# Patient Record
Sex: FEMALE | Race: BLACK OR AFRICAN AMERICAN | NOT HISPANIC OR LATINO | Employment: PART TIME | RURAL
[De-identification: names, ages, dates, MRNs, and addresses within clinical notes are randomized per-mention and may not be internally consistent; named-entity substitution may affect disease eponyms.]

---

## 2019-07-31 ENCOUNTER — HISTORICAL (OUTPATIENT)
Dept: ADMINISTRATIVE | Facility: HOSPITAL | Age: 44
End: 2019-07-31

## 2019-08-02 LAB
LAB AP COMMENTS: NORMAL
LAB AP GENERAL CAT - HISTORICAL: NORMAL
LAB AP INTERPRETATION/RESULT - HISTORICAL: NEGATIVE
LAB AP SPECIMEN ADEQUACY - HISTORICAL: NORMAL
LAB AP SPECIMEN SUBMITTED - HISTORICAL: NORMAL

## 2020-08-21 ENCOUNTER — HISTORICAL (OUTPATIENT)
Dept: ADMINISTRATIVE | Facility: HOSPITAL | Age: 45
End: 2020-08-21

## 2021-10-11 ENCOUNTER — HOSPITAL ENCOUNTER (EMERGENCY)
Facility: HOSPITAL | Age: 46
Discharge: HOME | End: 2021-10-11

## 2021-10-11 VITALS
SYSTOLIC BLOOD PRESSURE: 123 MMHG | HEIGHT: 69 IN | OXYGEN SATURATION: 97 % | WEIGHT: 293 LBS | HEART RATE: 83 BPM | BODY MASS INDEX: 43.4 KG/M2 | RESPIRATION RATE: 18 BRPM | TEMPERATURE: 98 F | DIASTOLIC BLOOD PRESSURE: 76 MMHG

## 2021-10-11 DIAGNOSIS — L02.91 ABSCESS: Primary | ICD-10-CM

## 2021-10-11 PROCEDURE — 99283 PR EMERGENCY DEPT VISIT,LEVEL III: ICD-10-PCS | Mod: ,,, | Performed by: NURSE PRACTITIONER

## 2021-10-11 PROCEDURE — 25000003 PHARM REV CODE 250: Performed by: NURSE PRACTITIONER

## 2021-10-11 PROCEDURE — 99284 EMERGENCY DEPT VISIT MOD MDM: CPT

## 2021-10-11 PROCEDURE — 99283 EMERGENCY DEPT VISIT LOW MDM: CPT | Mod: ,,, | Performed by: NURSE PRACTITIONER

## 2021-10-11 PROCEDURE — 96372 THER/PROPH/DIAG INJ SC/IM: CPT

## 2021-10-11 RX ORDER — CLINDAMYCIN PHOSPHATE 150 MG/ML
600 INJECTION, SOLUTION INTRAVENOUS
Status: COMPLETED | OUTPATIENT
Start: 2021-10-11 | End: 2021-10-11

## 2021-10-11 RX ORDER — GLIPIZIDE 10 MG/1
10 TABLET ORAL
COMMUNITY

## 2021-10-11 RX ORDER — MUPIROCIN 20 MG/G
OINTMENT TOPICAL 3 TIMES DAILY
Qty: 22 G | Refills: 0 | OUTPATIENT
Start: 2021-10-11 | End: 2023-01-22

## 2021-10-11 RX ORDER — CLINDAMYCIN HYDROCHLORIDE 300 MG/1
300 CAPSULE ORAL EVERY 6 HOURS
Qty: 40 CAPSULE | Refills: 0 | Status: SHIPPED | OUTPATIENT
Start: 2021-10-11 | End: 2021-10-21

## 2021-10-11 RX ORDER — METFORMIN HYDROCHLORIDE 500 MG/1
500 TABLET ORAL 2 TIMES DAILY WITH MEALS
COMMUNITY

## 2021-10-11 RX ADMIN — CLINDAMYCIN PHOSPHATE 600 MG: 150 INJECTION, SOLUTION INTRAVENOUS at 09:10

## 2021-10-12 ENCOUNTER — TELEPHONE (OUTPATIENT)
Dept: EMERGENCY MEDICINE | Facility: HOSPITAL | Age: 46
End: 2021-10-12

## 2021-12-19 ENCOUNTER — HOSPITAL ENCOUNTER (EMERGENCY)
Facility: HOSPITAL | Age: 46
Discharge: HOME | End: 2021-12-19
Attending: FAMILY MEDICINE

## 2021-12-19 VITALS
SYSTOLIC BLOOD PRESSURE: 124 MMHG | RESPIRATION RATE: 18 BRPM | BODY MASS INDEX: 43.4 KG/M2 | DIASTOLIC BLOOD PRESSURE: 78 MMHG | WEIGHT: 293 LBS | HEIGHT: 69 IN | TEMPERATURE: 98 F | OXYGEN SATURATION: 99 % | HEART RATE: 82 BPM

## 2021-12-19 DIAGNOSIS — I95.1 ORTHOSTATIC HYPOTENSION: ICD-10-CM

## 2021-12-19 DIAGNOSIS — I49.9 ARRHYTHMIA: ICD-10-CM
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
## 2021-12-19 DIAGNOSIS — S60.212A CONTUSION OF LEFT WRIST, INITIAL ENCOUNTER: Primary | ICD-10-CM

## 2021-12-19 DIAGNOSIS — R55 NEAR SYNCOPE: ICD-10-CM

## 2021-12-19 LAB
ALBUMIN SERPL BCP-MCNC: 3.4 G/DL (ref 3.5–5)
ALBUMIN/GLOB SERPL: 0.8 {RATIO}
ALP SERPL-CCNC: 93 U/L (ref 39–100)
ALT SERPL W P-5'-P-CCNC: 30 U/L (ref 13–56)
ANION GAP SERPL CALCULATED.3IONS-SCNC: 16 MMOL/L (ref 7–16)
AST SERPL W P-5'-P-CCNC: 13 U/L (ref 15–37)
BASOPHILS # BLD AUTO: 0.04 K/UL (ref 0–0.2)
BASOPHILS NFR BLD AUTO: 0.4 % (ref 0–1)
BILIRUB SERPL-MCNC: 0.3 MG/DL (ref 0–1.2)
BUN SERPL-MCNC: 10 MG/DL (ref 7–18)
BUN/CREAT SERPL: 9 (ref 6–20)
CALCIUM SERPL-MCNC: 9.2 MG/DL (ref 8.5–10.1)
CHLORIDE SERPL-SCNC: 100 MMOL/L (ref 98–107)
CO2 SERPL-SCNC: 27 MMOL/L (ref 21–32)
CREAT SERPL-MCNC: 1.12 MG/DL (ref 0.55–1.02)
DIFFERENTIAL METHOD BLD: ABNORMAL
EOSINOPHIL # BLD AUTO: 0.24 K/UL (ref 0–0.5)
EOSINOPHIL NFR BLD AUTO: 2.2 % (ref 1–4)
ERYTHROCYTE [DISTWIDTH] IN BLOOD BY AUTOMATED COUNT: 13.7 % (ref 11.5–14.5)
GLOBULIN SER-MCNC: 4.5 G/DL (ref 2–4)
GLUCOSE SERPL-MCNC: 311 MG/DL (ref 74–106)
HCT VFR BLD AUTO: 36.4 % (ref 38–47)
HGB BLD-MCNC: 12.5 G/DL (ref 12–16)
LYMPHOCYTES # BLD AUTO: 3.45 K/UL (ref 1–4.8)
LYMPHOCYTES NFR BLD AUTO: 31.1 % (ref 27–41)
MCH RBC QN AUTO: 30.3 PG (ref 27–31)
MCHC RBC AUTO-ENTMCNC: 34.3 G/DL (ref 32–36)
MCV RBC AUTO: 88.3 FL (ref 80–96)
MONOCYTES # BLD AUTO: 0.57 K/UL (ref 0–0.8)
MONOCYTES NFR BLD AUTO: 5.1 % (ref 2–6)
MPC BLD CALC-MCNC: 10.9 FL (ref 9.4–12.4)
NEUTROPHILS # BLD AUTO: 6.78 K/UL (ref 1.8–7.7)
NEUTROPHILS NFR BLD AUTO: 61.2 % (ref 53–65)
NT-PROBNP SERPL-MCNC: 17 PG/ML (ref 1–125)
PLATELET # BLD AUTO: 263 K/UL (ref 150–400)
POTASSIUM SERPL-SCNC: 4.5 MMOL/L (ref 3.5–5.1)
PROT SERPL-MCNC: 7.9 G/DL (ref 6.4–8.2)
RBC # BLD AUTO: 4.12 M/UL (ref 4.2–5.4)
SODIUM SERPL-SCNC: 138 MMOL/L (ref 136–145)
TROPONIN I SERPL HS-MCNC: 4.2 PG/ML
WBC # BLD AUTO: 11.08 K/UL (ref 4.5–11)

## 2021-12-19 PROCEDURE — 63600175 PHARM REV CODE 636 W HCPCS: Performed by: FAMILY MEDICINE

## 2021-12-19 PROCEDURE — 83880 ASSAY OF NATRIURETIC PEPTIDE: CPT | Performed by: FAMILY MEDICINE

## 2021-12-19 PROCEDURE — 96372 THER/PROPH/DIAG INJ SC/IM: CPT

## 2021-12-19 PROCEDURE — 99285 EMERGENCY DEPT VISIT HI MDM: CPT | Mod: 25

## 2021-12-19 PROCEDURE — 99283 PR EMERGENCY DEPT VISIT,LEVEL III: ICD-10-PCS | Mod: ,,, | Performed by: FAMILY MEDICINE

## 2021-12-19 PROCEDURE — 99283 EMERGENCY DEPT VISIT LOW MDM: CPT | Mod: ,,, | Performed by: FAMILY MEDICINE

## 2021-12-19 PROCEDURE — 80053 COMPREHEN METABOLIC PANEL: CPT | Performed by: FAMILY MEDICINE

## 2021-12-19 PROCEDURE — 93010 ELECTROCARDIOGRAM REPORT: CPT | Mod: ,,, | Performed by: INTERNAL MEDICINE

## 2021-12-19 PROCEDURE — 84484 ASSAY OF TROPONIN QUANT: CPT | Performed by: FAMILY MEDICINE

## 2021-12-19 PROCEDURE — 36415 COLL VENOUS BLD VENIPUNCTURE: CPT | Performed by: FAMILY MEDICINE

## 2021-12-19 PROCEDURE — 85025 COMPLETE CBC W/AUTO DIFF WBC: CPT | Performed by: FAMILY MEDICINE

## 2021-12-19 PROCEDURE — 93005 ELECTROCARDIOGRAM TRACING: CPT

## 2021-12-19 PROCEDURE — 93010 EKG 12-LEAD: ICD-10-PCS | Mod: ,,, | Performed by: INTERNAL MEDICINE

## 2021-12-19 RX ORDER — KETOROLAC TROMETHAMINE 30 MG/ML
30 INJECTION, SOLUTION INTRAMUSCULAR; INTRAVENOUS
Status: COMPLETED | OUTPATIENT
Start: 2021-12-19 | End: 2021-12-19

## 2021-12-19 RX ADMIN — KETOROLAC TROMETHAMINE 30 MG: 30 INJECTION, SOLUTION INTRAMUSCULAR at 05:12

## 2021-12-20 ENCOUNTER — TELEPHONE (OUTPATIENT)
Dept: EMERGENCY MEDICINE | Facility: HOSPITAL | Age: 46
End: 2021-12-20

## 2021-12-21 ENCOUNTER — TELEPHONE (OUTPATIENT)
Dept: EMERGENCY MEDICINE | Facility: HOSPITAL | Age: 46
End: 2021-12-21

## 2022-10-04 ENCOUNTER — HOSPITAL ENCOUNTER (EMERGENCY)
Facility: HOSPITAL | Age: 47
Discharge: HOME | End: 2022-10-04
Attending: EMERGENCY MEDICINE

## 2022-10-04 VITALS
OXYGEN SATURATION: 96 % | HEART RATE: 86 BPM | HEIGHT: 69 IN | DIASTOLIC BLOOD PRESSURE: 62 MMHG | TEMPERATURE: 98 F | SYSTOLIC BLOOD PRESSURE: 120 MMHG | BODY MASS INDEX: 43.4 KG/M2 | RESPIRATION RATE: 18 BRPM | WEIGHT: 293 LBS

## 2022-10-04 DIAGNOSIS — R42 VERTIGO: Primary | ICD-10-CM

## 2022-10-04 DIAGNOSIS — K52.9 GASTROENTERITIS: ICD-10-CM

## 2022-10-04 LAB
ALBUMIN SERPL BCP-MCNC: 3.7 G/DL (ref 3.5–5)
ALBUMIN/GLOB SERPL: 1 {RATIO}
ALP SERPL-CCNC: 81 U/L (ref 39–100)
ALT SERPL W P-5'-P-CCNC: 27 U/L (ref 13–56)
ANION GAP SERPL CALCULATED.3IONS-SCNC: 12 MMOL/L (ref 7–16)
AST SERPL W P-5'-P-CCNC: 21 U/L (ref 15–37)
BACTERIA #/AREA URNS HPF: ABNORMAL /HPF
BASOPHILS # BLD AUTO: 0.04 K/UL (ref 0–0.2)
BASOPHILS NFR BLD AUTO: 0.5 % (ref 0–1)
BILIRUB SERPL-MCNC: 0.5 MG/DL (ref ?–1.2)
BILIRUB UR QL STRIP: NEGATIVE
BUN SERPL-MCNC: 10 MG/DL (ref 7–18)
BUN/CREAT SERPL: 10 (ref 6–20)
CALCIUM SERPL-MCNC: 9.5 MG/DL (ref 8.5–10.1)
CHLORIDE SERPL-SCNC: 104 MMOL/L (ref 98–107)
CLARITY UR: CLEAR
CO2 SERPL-SCNC: 27 MMOL/L (ref 21–32)
COLOR UR: ABNORMAL
CREAT SERPL-MCNC: 0.96 MG/DL (ref 0.55–1.02)
DIFFERENTIAL METHOD BLD: ABNORMAL
EGFR (NO RACE VARIABLE) (RUSH/TITUS): 73 ML/MIN/1.73M²
EOSINOPHIL # BLD AUTO: 0.34 K/UL (ref 0–0.5)
EOSINOPHIL NFR BLD AUTO: 3.9 % (ref 1–4)
ERYTHROCYTE [DISTWIDTH] IN BLOOD BY AUTOMATED COUNT: 13.4 % (ref 11.5–14.5)
FLUAV AG UPPER RESP QL IA.RAPID: NEGATIVE
FLUBV AG UPPER RESP QL IA.RAPID: NEGATIVE
GLOBULIN SER-MCNC: 3.7 G/DL (ref 2–4)
GLUCOSE SERPL-MCNC: 201 MG/DL (ref 74–106)
GLUCOSE UR STRIP-MCNC: 70 MG/DL
HCT VFR BLD AUTO: 37.8 % (ref 38–47)
HGB BLD-MCNC: 12 G/DL (ref 12–16)
IMM GRANULOCYTES # BLD AUTO: 0.02 K/UL (ref 0–0.04)
IMM GRANULOCYTES NFR BLD: 0.2 % (ref 0–0.4)
KETONES UR STRIP-SCNC: NEGATIVE MG/DL
LEUKOCYTE ESTERASE UR QL STRIP: NEGATIVE
LYMPHOCYTES # BLD AUTO: 2.83 K/UL (ref 1–4.8)
LYMPHOCYTES NFR BLD AUTO: 32.2 % (ref 27–41)
MCH RBC QN AUTO: 29.6 PG (ref 27–31)
MCHC RBC AUTO-ENTMCNC: 31.7 G/DL (ref 32–36)
MCV RBC AUTO: 93.1 FL (ref 80–96)
MONOCYTES # BLD AUTO: 0.57 K/UL (ref 0–0.8)
MONOCYTES NFR BLD AUTO: 6.5 % (ref 2–6)
MPC BLD CALC-MCNC: 10.9 FL (ref 9.4–12.4)
MUCOUS THREADS #/AREA URNS HPF: ABNORMAL /HPF
NEUTROPHILS # BLD AUTO: 4.99 K/UL (ref 1.8–7.7)
NEUTROPHILS NFR BLD AUTO: 56.7 % (ref 53–65)
NITRITE UR QL STRIP: POSITIVE
NRBC # BLD AUTO: 0 X10E3/UL
NRBC, AUTO (.00): 0 %
PH UR STRIP: 5.5 PH UNITS
PLATELET # BLD AUTO: 240 K/UL (ref 150–400)
POTASSIUM SERPL-SCNC: 5.2 MMOL/L (ref 3.5–5.1)
PROT SERPL-MCNC: 7.4 G/DL (ref 6.4–8.2)
PROT UR QL STRIP: NEGATIVE
RBC # BLD AUTO: 4.06 M/UL (ref 4.2–5.4)
RBC # UR STRIP: ABNORMAL /UL
RBC #/AREA URNS HPF: ABNORMAL /HPF
SARS-COV+SARS-COV-2 AG RESP QL IA.RAPID: NEGATIVE
SODIUM SERPL-SCNC: 138 MMOL/L (ref 136–145)
SP GR UR STRIP: 1.02
SQUAMOUS #/AREA URNS LPF: ABNORMAL /LPF
UROBILINOGEN UR STRIP-ACNC: NORMAL MG/DL
WBC # BLD AUTO: 8.79 K/UL (ref 4.5–11)
WBC #/AREA URNS HPF: ABNORMAL /HPF

## 2022-10-04 PROCEDURE — 87186 SC STD MICRODIL/AGAR DIL: CPT | Performed by: EMERGENCY MEDICINE

## 2022-10-04 PROCEDURE — 63600175 PHARM REV CODE 636 W HCPCS: Performed by: EMERGENCY MEDICINE

## 2022-10-04 PROCEDURE — 99284 PR EMERGENCY DEPT VISIT,LEVEL IV: ICD-10-PCS | Mod: CS,,, | Performed by: EMERGENCY MEDICINE

## 2022-10-04 PROCEDURE — 80053 COMPREHEN METABOLIC PANEL: CPT | Performed by: EMERGENCY MEDICINE

## 2022-10-04 PROCEDURE — 99284 EMERGENCY DEPT VISIT MOD MDM: CPT | Mod: CS,,, | Performed by: EMERGENCY MEDICINE

## 2022-10-04 PROCEDURE — 87428 SARSCOV & INF VIR A&B AG IA: CPT | Performed by: EMERGENCY MEDICINE

## 2022-10-04 PROCEDURE — 96365 THER/PROPH/DIAG IV INF INIT: CPT

## 2022-10-04 PROCEDURE — 99285 EMERGENCY DEPT VISIT HI MDM: CPT | Mod: 25

## 2022-10-04 PROCEDURE — 87077 CULTURE AEROBIC IDENTIFY: CPT | Performed by: EMERGENCY MEDICINE

## 2022-10-04 PROCEDURE — 25000003 PHARM REV CODE 250: Performed by: EMERGENCY MEDICINE

## 2022-10-04 PROCEDURE — 36415 COLL VENOUS BLD VENIPUNCTURE: CPT | Performed by: EMERGENCY MEDICINE

## 2022-10-04 PROCEDURE — 96366 THER/PROPH/DIAG IV INF ADDON: CPT

## 2022-10-04 PROCEDURE — 81001 URINALYSIS AUTO W/SCOPE: CPT | Performed by: EMERGENCY MEDICINE

## 2022-10-04 PROCEDURE — 85025 COMPLETE CBC W/AUTO DIFF WBC: CPT | Performed by: EMERGENCY MEDICINE

## 2022-10-04 RX ORDER — MECLIZINE HYDROCHLORIDE 25 MG/1
25 TABLET ORAL
Status: COMPLETED | OUTPATIENT
Start: 2022-10-04 | End: 2022-10-04

## 2022-10-04 RX ORDER — PROMETHAZINE HYDROCHLORIDE 25 MG/1
25 TABLET ORAL EVERY 6 HOURS PRN
Qty: 16 TABLET | Refills: 0 | OUTPATIENT
Start: 2022-10-04 | End: 2023-01-22

## 2022-10-04 RX ORDER — PROMETHAZINE HYDROCHLORIDE 25 MG/1
25 SUPPOSITORY RECTAL EVERY 6 HOURS PRN
Qty: 12 SUPPOSITORY | Refills: 0 | OUTPATIENT
Start: 2022-10-04 | End: 2023-01-22

## 2022-10-04 RX ORDER — MECLIZINE HYDROCHLORIDE 25 MG/1
25 TABLET ORAL 3 TIMES DAILY PRN
Qty: 20 TABLET | Refills: 0 | OUTPATIENT
Start: 2022-10-04 | End: 2023-01-22

## 2022-10-04 RX ADMIN — SODIUM CHLORIDE 1000 ML: 9 INJECTION, SOLUTION INTRAVENOUS at 11:10

## 2022-10-04 RX ADMIN — MECLIZINE HYDROCHLORIDE 25 MG: 25 TABLET ORAL at 11:10

## 2022-10-04 RX ADMIN — PROMETHAZINE HYDROCHLORIDE 25 MG: 25 INJECTION INTRAMUSCULAR; INTRAVENOUS at 11:10

## 2022-10-04 NOTE — ED PROVIDER NOTES
Encounter Date: 10/4/2022       History     Chief Complaint   Patient presents with    Dizziness     Patient is a 47-year-old female who felt fine last night but woke up this morning with a moderate frontal headache and dizziness which she describes as a sensation of spinning.  Has also had about 3 episodes of diarrhea.  Patient also reports some chills and muscle aches and pains.  Patient denies fever.  No cough, no chest pain or shortness of breath, no other acute problems or complaints at this time.  Patient is diabetic but on insulin, she states her blood sugar last night was 160.  Patient states that the dizziness has been intermittent and is definitely worse when she turns her head or changes position.    Review of patient's allergies indicates:  No Known Allergies  Past Medical History:   Diagnosis Date    Asthma     Diabetes mellitus     Gout, unspecified     Thyroid disease      Past Surgical History:   Procedure Laterality Date    FINGER AMPUTATION Left     PARTIAL INDEX FINGER    HYSTERECTOMY      PARTIAL    OPEN REDUCTION AND INTERNAL FIXATION (ORIF) OF INJURY OF ANKLE Right      Family History   Family history unknown: Yes     Social History     Tobacco Use    Smoking status: Never    Smokeless tobacco: Never   Substance Use Topics    Alcohol use: Never    Drug use: Never     Review of Systems   Constitutional:  Positive for chills.   Gastrointestinal:  Positive for diarrhea, nausea and vomiting.   Musculoskeletal:  Positive for myalgias.   Neurological:  Positive for dizziness and headaches.   All other systems reviewed and are negative.    Physical Exam     Initial Vitals [10/04/22 1027]   BP Pulse Resp Temp SpO2   126/68 87 18 98.4 °F (36.9 °C) 99 %      MAP       --         Physical Exam    Nursing note and vitals reviewed.  Constitutional: She appears well-developed and well-nourished.   HENT:   Head: Normocephalic.   Eyes: Pupils are equal, round, and reactive to light.   Cardiovascular:  Normal  rate.           Pulmonary/Chest: Breath sounds normal.   Abdominal: Abdomen is soft.   Musculoskeletal:         General: Normal range of motion.     Neurological: She is alert and oriented to person, place, and time. She has normal strength. No cranial nerve deficit or sensory deficit.   Does have nystagmus with lateral gaze.  This is mild.   Skin: Skin is warm. Capillary refill takes less than 2 seconds.   Psychiatric: She has a normal mood and affect.       Medical Screening Exam   See Full Note    ED Course   Procedures  Labs Reviewed   COMPREHENSIVE METABOLIC PANEL - Abnormal; Notable for the following components:       Result Value    Potassium 5.2 (*)     Glucose 201 (*)     All other components within normal limits   URINALYSIS, REFLEX TO URINE CULTURE - Abnormal; Notable for the following components:    Nitrites, UA Positive (*)     Glucose, UA 70 (*)     Blood, UA Trace (*)     All other components within normal limits   CBC WITH DIFFERENTIAL - Abnormal; Notable for the following components:    RBC 4.06 (*)     Hematocrit 37.8 (*)     MCHC 31.7 (*)     Monocytes % 6.5 (*)     All other components within normal limits   URINALYSIS, MICROSCOPIC - Abnormal; Notable for the following components:    Bacteria, UA Many (*)     Squamous Epithelial Cells, UA Few (*)     Mucus, UA Few (*)     All other components within normal limits   SARS-COV2 (COVID) W/ FLU ANTIGEN - Normal    Narrative:     Negative SARS-CoV results should not be used as the sole basis for treatment or patient management decisions; negative results should be considered in the context of a patient's recent exposures, history and the presene of clinical signs and symptoms consistent with COVID-19.  Negative results should be treated as presumptive and confirmed by molecular assay, if necessary for patient management.   CULTURE, URINE   CBC W/ AUTO DIFFERENTIAL    Narrative:     The following orders were created for panel order CBC auto  differential.  Procedure                               Abnormality         Status                     ---------                               -----------         ------                     CBC with Differential[[id number]]        Abnormal            Final result                 Please view results for these tests on the individual orders.   EXTRA TUBES    Narrative:     The following orders were created for panel order EXTRA TUBES.  Procedure                               Abnormality         Status                     ---------                               -----------         ------                     Light Blue Top Hold[[id number]]                              In process                 Light Green Top Hold[[id number]]                             In process                   Please view results for these tests on the individual orders.   LIGHT BLUE TOP HOLD   LIGHT GREEN TOP HOLD          Imaging Results              CT Head Without Contrast (Final result)  Result time 10/04/22 11:51:26      Final result by Jaren Vital DO (10/04/22 11:51:26)                   Impression:      No convincing imaging evidence of acute intracranial abnormality.    The CT exam was performed using one or more of the following dose    reduction techniques- Automated exposure control, adjustment of the mA    and/or kV according to patient size, and/or use of iterative    reconstructed technique.    Point of Service: [facility]      Electronically signed by: Jaren Vital  Date:    10/04/2022  Time:    11:51               Narrative:    EXAMINATION:  CT HEAD WITHOUT CONTRAST    CLINICAL HISTORY:  Headache, sudden, severe;    COMPARISON:  CT brain December 19, 2021    TECHNIQUE:  Multiple axial tomographic images of the brain were obtained without the use of intravenous contrast.    FINDINGS:  Midline structures are nondisplaced.  No convincing evidence of acute intracranial hemorrhage.  No convincing evidence of  hydrocephalus.  Visualized paranasal sinuses and mastoid air cells are predominantly clear.                                       Medications   sodium chloride 0.9% bolus 1,000 mL (1,000 mLs Intravenous New Bag 10/4/22 1145)   promethazine (PHENERGAN) 25 mg in dextrose 5 % 50 mL IVPB (25 mg Intravenous New Bag 10/4/22 1147)   meclizine tablet 25 mg (25 mg Oral Given 10/4/22 1147)                 ED Course as of 10/04/22 1317   Tue Oct 04, 2022   1301 Influenza A: Negative [BB]   1301 Influenza B, Molecular: Negative [BB]   1301 COVID-19 Ag: Negative [BB]   1301 WBC, UA: 0-5 [BB]   1301 RBC, UA: 0-3 [BB]   1301 WBC: 8.79 [BB]   1301 Hemoglobin: 12.0 [BB]   1301 Hematocrit(!): 37.8 [BB]   1301 Potassium(!): 5.2 [BB]   1301 Glucose(!): 201 [BB]   1301 BUN: 10 [BB]   1301 Creatinine: 0.96 [BB]   1309 Patient feels much better, no further vomiting, no further dizziness. Patient does still have some nausea and states that she just feels bad.  Ready for discharge home. [BB]      ED Course User Index  [BB] Paulo Keane MD          Clinical Impression:   Final diagnoses:  [R42] Vertigo (Primary)  [K52.9] Gastroenteritis      ED Disposition Condition    Discharge Stable          ED Prescriptions       Medication Sig Dispense Start Date End Date Auth. Provider    promethazine (PHENERGAN) 25 MG tablet Take 1 tablet (25 mg total) by mouth every 6 (six) hours as needed for Nausea. 16 tablet 10/4/2022 -- Paulo Keane MD    promethazine (PHENERGAN) 25 MG suppository Place 1 suppository (25 mg total) rectally every 6 (six) hours as needed for Nausea. 12 suppository 10/4/2022 -- Paulo Keane MD    meclizine (ANTIVERT) 25 mg tablet Take 1 tablet (25 mg total) by mouth 3 (three) times daily as needed for Dizziness. 20 tablet 10/4/2022 -- Paulo Keane MD          Follow-up Information    None          Paulo Keane MD  10/04/22 8264

## 2022-10-04 NOTE — DISCHARGE INSTRUCTIONS
Take medication as prescribed.  Drink plenty of clear liquids.  Return to emergency department for uncontrolled vomiting, or other worsening or further problems.  Follow up in clinic with primary care provider in 2-3 days if symptoms persist.  Take over-the-counter Imodium for diarrhea if needed.

## 2022-10-04 NOTE — Clinical Note
"Salina "Salina" Tenzin was seen and treated in our emergency department on 10/4/2022.  She may return to work on 10/06/2022.       If you have any questions or concerns, please don't hesitate to call.      SAMANTHA Vieira    "

## 2022-10-05 ENCOUNTER — TELEPHONE (OUTPATIENT)
Dept: EMERGENCY MEDICINE | Facility: HOSPITAL | Age: 47
End: 2022-10-05

## 2022-10-06 ENCOUNTER — TELEPHONE (OUTPATIENT)
Dept: EMERGENCY MEDICINE | Facility: HOSPITAL | Age: 47
End: 2022-10-06

## 2022-10-06 LAB — UA COMPLETE W REFLEX CULTURE PNL UR: ABNORMAL

## 2022-10-06 RX ORDER — NITROFURANTOIN 25; 75 MG/1; MG/1
100 CAPSULE ORAL 2 TIMES DAILY
Qty: 14 CAPSULE | Refills: 0 | Status: SHIPPED | OUTPATIENT
Start: 2022-10-06 | End: 2022-10-13

## 2022-10-06 NOTE — TELEPHONE ENCOUNTER
----- Message from JESUS York sent at 10/6/2022  8:32 AM CDT -----  Please notify the patient that I sent in a rx to her pharmacy

## 2022-10-08 ENCOUNTER — HOSPITAL ENCOUNTER (EMERGENCY)
Facility: HOSPITAL | Age: 47
Discharge: HOME | End: 2022-10-08

## 2022-10-08 VITALS
SYSTOLIC BLOOD PRESSURE: 153 MMHG | BODY MASS INDEX: 43.4 KG/M2 | HEIGHT: 69 IN | RESPIRATION RATE: 16 BRPM | TEMPERATURE: 98 F | HEART RATE: 87 BPM | DIASTOLIC BLOOD PRESSURE: 84 MMHG | OXYGEN SATURATION: 98 % | WEIGHT: 293 LBS

## 2022-10-08 DIAGNOSIS — M54.50 LOW BACK PAIN, UNSPECIFIED BACK PAIN LATERALITY, UNSPECIFIED CHRONICITY, UNSPECIFIED WHETHER SCIATICA PRESENT: ICD-10-CM

## 2022-10-08 DIAGNOSIS — M79.606 PAIN OF LOWER EXTREMITY, UNSPECIFIED LATERALITY: Primary | ICD-10-CM

## 2022-10-08 PROCEDURE — 99285 EMERGENCY DEPT VISIT HI MDM: CPT | Mod: 25

## 2022-10-08 PROCEDURE — 99284 EMERGENCY DEPT VISIT MOD MDM: CPT | Mod: ,,, | Performed by: NURSE PRACTITIONER

## 2022-10-08 PROCEDURE — 96372 THER/PROPH/DIAG INJ SC/IM: CPT

## 2022-10-08 PROCEDURE — 99284 PR EMERGENCY DEPT VISIT,LEVEL IV: ICD-10-PCS | Mod: ,,, | Performed by: NURSE PRACTITIONER

## 2022-10-08 PROCEDURE — 63600175 PHARM REV CODE 636 W HCPCS: Performed by: NURSE PRACTITIONER

## 2022-10-08 RX ORDER — METHOCARBAMOL 500 MG/1
1000 TABLET, FILM COATED ORAL 3 TIMES DAILY
Qty: 30 TABLET | Refills: 0 | Status: SHIPPED | OUTPATIENT
Start: 2022-10-08 | End: 2022-10-13

## 2022-10-08 RX ORDER — IBUPROFEN 800 MG/1
800 TABLET ORAL 3 TIMES DAILY
Qty: 15 TABLET | Refills: 0 | Status: SHIPPED | OUTPATIENT
Start: 2022-10-08 | End: 2022-10-13

## 2022-10-08 RX ORDER — ORPHENADRINE CITRATE 30 MG/ML
60 INJECTION INTRAMUSCULAR; INTRAVENOUS
Status: COMPLETED | OUTPATIENT
Start: 2022-10-08 | End: 2022-10-08

## 2022-10-08 RX ORDER — KETOROLAC TROMETHAMINE 30 MG/ML
60 INJECTION, SOLUTION INTRAMUSCULAR; INTRAVENOUS
Status: COMPLETED | OUTPATIENT
Start: 2022-10-08 | End: 2022-10-08

## 2022-10-08 RX ADMIN — KETOROLAC TROMETHAMINE 60 MG: 30 INJECTION, SOLUTION INTRAMUSCULAR; INTRAVENOUS at 12:10

## 2022-10-08 RX ADMIN — ORPHENADRINE CITRATE 60 MG: 30 INJECTION INTRAMUSCULAR; INTRAVENOUS at 12:10

## 2022-10-08 NOTE — ED TRIAGE NOTES
Presents to ED c/o bilateral lower extremity pain from her knees down to her feet that began last night while laying in bed. Denies injury.

## 2022-10-08 NOTE — ED PROVIDER NOTES
Encounter Date: 10/8/2022       History     Chief Complaint   Patient presents with    Leg Pain     47 year old female presents to the emergency department to be evaluated for bilateral lower leg pain that began this morning. Denies any injury, fever, chills, chest pain, shortness of breath. She has also had some low back pain. She has chronic low back pain. Denies any recent fall or injury.     The history is provided by the patient.   Leg Pain   Pertinent negatives include no numbness, no inability to bear weight, no loss of motion, no muscle weakness, no loss of sensation and no tingling.   Review of patient's allergies indicates:   Allergen Reactions    Tramadol Rash     Past Medical History:   Diagnosis Date    Asthma     Diabetes mellitus     Gout, unspecified     Thyroid disease      Past Surgical History:   Procedure Laterality Date    FINGER AMPUTATION Left     PARTIAL INDEX FINGER    HYSTERECTOMY      PARTIAL    OPEN REDUCTION AND INTERNAL FIXATION (ORIF) OF INJURY OF ANKLE Right      Family History   Family history unknown: Yes     Social History     Tobacco Use    Smoking status: Never    Smokeless tobacco: Never   Substance Use Topics    Alcohol use: Never    Drug use: Never     Review of Systems   Constitutional:  Negative for chills and fever.   Neurological:  Negative for tingling and numbness.   All other systems reviewed and are negative.    Physical Exam     Initial Vitals [10/08/22 0845]   BP Pulse Resp Temp SpO2   (!) 153/84 87 16 98.4 °F (36.9 °C) 98 %      MAP       --         Physical Exam    Vitals reviewed.  Constitutional: She appears well-developed and well-nourished.   obese   Neck: Neck supple.   Cardiovascular:  Normal rate and regular rhythm.           Pulmonary/Chest: Breath sounds normal.   Abdominal: Abdomen is soft. Bowel sounds are normal. She exhibits no distension and no mass. There is no abdominal tenderness. There is no rebound and no guarding.   Musculoskeletal:          General: Tenderness present. No edema. Normal range of motion.      Cervical back: Neck supple.     Neurological: She is alert and oriented to person, place, and time. She has normal strength. GCS score is 15. GCS eye subscore is 4. GCS verbal subscore is 5. GCS motor subscore is 6.   Skin: Skin is warm and dry. Capillary refill takes less than 2 seconds.   Psychiatric: She has a normal mood and affect.       Medical Screening Exam   See Full Note    ED Course   Procedures  Labs Reviewed - No data to display       Imaging Results              X-Ray Lumbar Spine Ap And Lateral (Final result)  Result time 10/08/22 12:01:43      Final result by Kwame Foote MD (10/08/22 12:01:43)                   Impression:      No acute fracture or subluxation in the lumbar spine.    Multilevel mild-moderate degenerative changes as detailed above.    Place of service: [facility]      Electronically signed by: Kwame Foote  Date:    10/08/2022  Time:    12:01               Narrative:    EXAMINATION:  XR LUMBAR SPINE AP AND LATERAL    CLINICAL HISTORY:  low back pain;    COMPARISON:  None    FINDINGS:  No acute fracture or subluxation in the lumbar spine.  Multilevel mild-moderate degenerative changes are noted with somewhat prominent facet arthropathy from L3 through S1.  Small anterior bridging osteophytes are noted within the lower thoracic spine and predominantly throughout the lumbar spine.  No suspicious osseous lesions are identified.  There is mild disc space loss at L5-S1.  Other intervertebral discs appear generally maintained.    There is no focal soft tissue abnormality.  Cholecystectomy clips are suggested in the right upper quadrant.  No radiopaque foreign bodies are identified in the soft tissues.                                       US Lower Extremity Veins Bilateral (Final result)  Result time 10/08/22 10:52:25      Final result by Kwame Foote MD (10/08/22 10:52:25)                    Impression:      Unremarkable duplex imaging of the bilateral lower extremity. No evidence of DVT.    Place of service: [facility]      Electronically signed by: Kwame Foote  Date:    10/08/2022  Time:    10:52               Narrative:    EXAMINATION:  Ultrasound veins lower extremity bilateral    CLINICAL HISTORY:  Leg pain    DUPLEX SCAN OF THE bilateral LOWER EXTREMITY VEINS    Date: 10/08/2022    TECHNIQUE:  Duplex scan of the bilateral lower extremity veins using the B-mode/grayscale imaging and Doppler spectral analysis and color-flow    COMPARISON:  None    FINDINGS:  Major venous structures of the bilateral lower extremity demonstrate a normal course and caliber. There is no evidence of deep venous thrombosis. No abnormal intrinsic echogenic lesions are demonstrated in the scanned blood vessels. The visualized veins demonstrate complete compressibility with normal color Doppler flow and spectral analysis.    Ultrasound images were captured and stored.                                       Medications   ketorolac injection 60 mg (has no administration in time range)   orphenadrine injection 60 mg (has no administration in time range)                       Clinical Impression:   Final diagnoses:  [M79.606] Pain of lower extremity, unspecified laterality (Primary)  [M54.50] Low back pain, unspecified back pain laterality, unspecified chronicity, unspecified whether sciatica present      ED Disposition Condition    Discharge Stable          ED Prescriptions       Medication Sig Dispense Start Date End Date Auth. Provider    methocarbamoL (ROBAXIN) 500 MG Tab Take 2 tablets (1,000 mg total) by mouth 3 (three) times daily. for 5 days 30 tablet 10/8/2022 10/13/2022 JESUS York    ibuprofen (ADVIL,MOTRIN) 800 MG tablet Take 1 tablet (800 mg total) by mouth 3 (three) times daily. for 5 days 15 tablet 10/8/2022 10/13/2022 JESUS York          Follow-up Information    None           Kailey Hurst, [facility]  10/08/22 1246

## 2022-10-08 NOTE — Clinical Note
"Salina "Salina" Tenzin was seen and treated in our emergency department on 10/8/2022.  She may return to work on 10/10/2022.       If you have any questions or concerns, please don't hesitate to call.       RN    "

## 2023-01-22 ENCOUNTER — HOSPITAL ENCOUNTER (EMERGENCY)
Facility: HOSPITAL | Age: 48
Discharge: HOME | End: 2023-01-22
Payer: COMMERCIAL

## 2023-01-22 VITALS
BODY MASS INDEX: 43.4 KG/M2 | HEART RATE: 102 BPM | WEIGHT: 293 LBS | DIASTOLIC BLOOD PRESSURE: 66 MMHG | HEIGHT: 69 IN | SYSTOLIC BLOOD PRESSURE: 134 MMHG | TEMPERATURE: 99 F | RESPIRATION RATE: 20 BRPM | OXYGEN SATURATION: 96 %

## 2023-01-22 DIAGNOSIS — J32.0 MAXILLARY SINUSITIS, UNSPECIFIED CHRONICITY: Primary | ICD-10-CM

## 2023-01-22 DIAGNOSIS — B37.0 ORAL CANDIDIASIS: ICD-10-CM

## 2023-01-22 DIAGNOSIS — R05.9 COUGH: ICD-10-CM

## 2023-01-22 LAB
ALBUMIN SERPL BCP-MCNC: 3.5 G/DL (ref 3.5–5)
ALBUMIN/GLOB SERPL: 0.8 {RATIO}
ALP SERPL-CCNC: 75 U/L (ref 41–108)
ALT SERPL W P-5'-P-CCNC: 33 U/L (ref 13–56)
ANION GAP SERPL CALCULATED.3IONS-SCNC: 13 MMOL/L (ref 7–16)
AST SERPL W P-5'-P-CCNC: 22 U/L (ref 15–37)
BASOPHILS # BLD AUTO: 0.02 K/UL (ref 0–0.2)
BASOPHILS NFR BLD AUTO: 0.3 % (ref 0–1)
BILIRUB SERPL-MCNC: 0.6 MG/DL (ref ?–1.2)
BUN SERPL-MCNC: 9 MG/DL (ref 7–18)
BUN/CREAT SERPL: 9 (ref 6–20)
CALCIUM SERPL-MCNC: 9.1 MG/DL (ref 8.5–10.1)
CHLORIDE SERPL-SCNC: 100 MMOL/L (ref 98–107)
CO2 SERPL-SCNC: 28 MMOL/L (ref 21–32)
CREAT SERPL-MCNC: 0.99 MG/DL (ref 0.55–1.02)
DIFFERENTIAL METHOD BLD: ABNORMAL
EGFR (NO RACE VARIABLE) (RUSH/TITUS): 70 ML/MIN/1.73M²
EOSINOPHIL # BLD AUTO: 0.13 K/UL (ref 0–0.5)
EOSINOPHIL NFR BLD AUTO: 1.7 % (ref 1–4)
ERYTHROCYTE [DISTWIDTH] IN BLOOD BY AUTOMATED COUNT: 13.5 % (ref 11.5–14.5)
FLUAV AG UPPER RESP QL IA.RAPID: NEGATIVE
FLUBV AG UPPER RESP QL IA.RAPID: NEGATIVE
GLOBULIN SER-MCNC: 4.4 G/DL (ref 2–4)
GLUCOSE SERPL-MCNC: 181 MG/DL (ref 74–106)
HCT VFR BLD AUTO: 37.7 % (ref 38–47)
HGB BLD-MCNC: 12.3 G/DL (ref 12–16)
LYMPHOCYTES # BLD AUTO: 0.99 K/UL (ref 1–4.8)
LYMPHOCYTES NFR BLD AUTO: 12.9 % (ref 27–41)
MCH RBC QN AUTO: 29.6 PG (ref 27–31)
MCHC RBC AUTO-ENTMCNC: 32.6 G/DL (ref 32–36)
MCV RBC AUTO: 90.6 FL (ref 80–96)
MONOCYTES # BLD AUTO: 0.48 K/UL (ref 0–0.8)
MONOCYTES NFR BLD AUTO: 6.2 % (ref 2–6)
MPC BLD CALC-MCNC: 10.2 FL (ref 9.4–12.4)
NEUTROPHILS # BLD AUTO: 6.07 K/UL (ref 1.8–7.7)
NEUTROPHILS NFR BLD AUTO: 78.9 % (ref 53–65)
PLATELET # BLD AUTO: 286 K/UL (ref 150–400)
POTASSIUM SERPL-SCNC: 4.1 MMOL/L (ref 3.5–5.1)
PROT SERPL-MCNC: 7.9 G/DL (ref 6.4–8.2)
RAPID GROUP A STREP: NEGATIVE
RBC # BLD AUTO: 4.16 M/UL (ref 4.2–5.4)
SODIUM SERPL-SCNC: 137 MMOL/L (ref 136–145)
WBC # BLD AUTO: 7.69 K/UL (ref 4.5–11)

## 2023-01-22 PROCEDURE — 80053 COMPREHEN METABOLIC PANEL: CPT | Performed by: NURSE PRACTITIONER

## 2023-01-22 PROCEDURE — 87880 STREP A ASSAY W/OPTIC: CPT | Performed by: NURSE PRACTITIONER

## 2023-01-22 PROCEDURE — 99284 PR EMERGENCY DEPT VISIT,LEVEL IV: ICD-10-PCS | Mod: ,,, | Performed by: NURSE PRACTITIONER

## 2023-01-22 PROCEDURE — 87804 INFLUENZA ASSAY W/OPTIC: CPT | Performed by: NURSE PRACTITIONER

## 2023-01-22 PROCEDURE — 99284 EMERGENCY DEPT VISIT MOD MDM: CPT | Mod: ,,, | Performed by: NURSE PRACTITIONER

## 2023-01-22 PROCEDURE — 99284 EMERGENCY DEPT VISIT MOD MDM: CPT | Mod: 25

## 2023-01-22 PROCEDURE — 87081 CULTURE SCREEN ONLY: CPT | Performed by: NURSE PRACTITIONER

## 2023-01-22 PROCEDURE — 85025 COMPLETE CBC W/AUTO DIFF WBC: CPT | Performed by: NURSE PRACTITIONER

## 2023-01-22 RX ORDER — AMOXICILLIN AND CLAVULANATE POTASSIUM 875; 125 MG/1; MG/1
1 TABLET, FILM COATED ORAL 2 TIMES DAILY
Qty: 20 TABLET | Refills: 0 | OUTPATIENT
Start: 2023-01-22 | End: 2023-05-25

## 2023-01-22 RX ORDER — NYSTATIN 100000 [USP'U]/ML
6 SUSPENSION ORAL 4 TIMES DAILY
Qty: 240 ML | Refills: 0 | Status: SHIPPED | OUTPATIENT
Start: 2023-01-22 | End: 2023-02-01

## 2023-01-22 RX ORDER — ALBUTEROL SULFATE 90 UG/1
2 AEROSOL, METERED RESPIRATORY (INHALATION) EVERY 6 HOURS PRN
Qty: 18 G | Refills: 0 | Status: SHIPPED | OUTPATIENT
Start: 2023-01-22 | End: 2024-01-22

## 2023-01-22 NOTE — ED PROVIDER NOTES
Encounter Date: 1/22/2023       History     Chief Complaint   Patient presents with    Sore Throat    Cough    hurts to take deep breath     Patient is a 47 y/o AAF with pmh DM2, Gout and Asthma presents to the ED with complaint of sore throat, green sinus drainage and fever for 2 days. Had Tmax 102 yesterday that resolved with tylenol. Has not taken any tylenol or ibuprofen today. Reports sore throat, difficulty swallowing due to sore throat and tongue is white. Denies any history of thrush. Also had nausea with vomiting x 1 just PTA that was not associated with coughing. Patient was diagnosed with COVID 10 days ago and was given prescription for Zpak at that time. Patient completed Zpak and Zyrtec.  Had COVID vaccine, but no booster. Did not take flu vaccine.    The history is provided by the patient.   Review of patient's allergies indicates:   Allergen Reactions    Tramadol Rash     Past Medical History:   Diagnosis Date    Asthma     Diabetes mellitus     Gout, unspecified     Thyroid disease      Past Surgical History:   Procedure Laterality Date    FINGER AMPUTATION Left     PARTIAL INDEX FINGER    HYSTERECTOMY      PARTIAL    OPEN REDUCTION AND INTERNAL FIXATION (ORIF) OF INJURY OF ANKLE Right      Family History   Family history unknown: Yes     Social History     Tobacco Use    Smoking status: Never    Smokeless tobacco: Never   Substance Use Topics    Alcohol use: Never    Drug use: Never     Review of Systems   Constitutional:  Positive for activity change, fatigue and fever. Negative for appetite change.   HENT:  Positive for congestion, ear pain, sinus pressure, sinus pain, sore throat and trouble swallowing (due to painful). Negative for ear discharge.    Eyes: Negative.  Negative for pain, redness and visual disturbance.   Respiratory:  Positive for cough and wheezing. Negative for shortness of breath.    Cardiovascular: Negative.  Negative for chest pain, palpitations and leg swelling.    Gastrointestinal:  Positive for nausea and vomiting. Negative for abdominal pain, constipation and diarrhea.   Genitourinary: Negative.  Negative for dysuria, frequency, vaginal discharge and vaginal pain.   Musculoskeletal: Negative.    Skin: Negative.  Negative for rash.   Neurological:  Negative for dizziness, weakness, light-headedness, numbness and headaches.   Hematological: Negative.    Psychiatric/Behavioral: Negative.       Physical Exam     Initial Vitals [01/22/23 1055]   BP Pulse Resp Temp SpO2   134/66 102 20 98.5 °F (36.9 °C) 96 %      MAP       --         Physical Exam    Nursing note and vitals reviewed.  Constitutional: Vital signs are normal. She appears well-developed and well-nourished. She is cooperative. She appears ill. No distress.   HENT:   Head: Normocephalic and atraumatic.   Right Ear: Tympanic membrane, external ear and ear canal normal.   Left Ear: Tympanic membrane, external ear and ear canal normal.   Nose: Mucosal edema present. No rhinorrhea or sinus tenderness. Right sinus exhibits maxillary sinus tenderness. Right sinus exhibits no frontal sinus tenderness. Left sinus exhibits maxillary sinus tenderness. Left sinus exhibits no frontal sinus tenderness.   Mouth/Throat: Uvula is midline. Posterior oropharyngeal erythema present. No posterior oropharyngeal edema or tonsillar abscesses.   Tongue is white and white patches noted on oral mucosal.   Eyes: Conjunctivae and lids are normal. Pupils are equal, round, and reactive to light.   Neck: Neck supple.   Normal range of motion.  Cardiovascular:  Normal rate, regular rhythm, normal heart sounds and normal pulses.           Pulmonary/Chest: Effort normal and breath sounds normal.   Abdominal: Abdomen is soft. There is no abdominal tenderness.   Musculoskeletal:         General: Normal range of motion.      Cervical back: Normal range of motion and neck supple.     Lymphadenopathy:     She has cervical adenopathy.        Right  cervical: Superficial cervical adenopathy present. No posterior cervical adenopathy present.       Left cervical: Superficial cervical adenopathy present. No posterior cervical adenopathy present.   Neurological: She is alert and oriented to person, place, and time.   Skin: Skin is warm and dry. Capillary refill takes less than 2 seconds. No rash noted.   Psychiatric: She has a normal mood and affect. Her speech is normal and behavior is normal. Judgment and thought content normal.       Medical Screening Exam   See Full Note    ED Course   Procedures  Labs Reviewed   COMPREHENSIVE METABOLIC PANEL - Abnormal; Notable for the following components:       Result Value    Glucose 181 (*)     Globulin 4.4 (*)     All other components within normal limits   CBC WITH DIFFERENTIAL - Abnormal; Notable for the following components:    RBC 4.16 (*)     Hematocrit 37.7 (*)     Neutrophils % 78.9 (*)     Lymphocytes % 12.9 (*)     Lymphocytes, Absolute 0.99 (*)     Monocytes % 6.2 (*)     All other components within normal limits   THROAT SCREEN, RAPID STREP - Normal   RAPID INFLUENZA A/B - Normal   CULTURE, STREP A,  THROAT   CBC W/ AUTO DIFFERENTIAL    Narrative:     The following orders were created for panel order CBC auto differential.  Procedure                               Abnormality         Status                     ---------                               -----------         ------                     CBC with Differential[[id number]]        Abnormal            Final result                 Please view results for these tests on the individual orders.          Imaging Results              X-Ray Chest PA And Lateral (Final result)  Result time 01/22/23 11:27:10      Final result by Dung Hinkle DO (01/22/23 11:27:10)                   Impression:      No acute pulmonary disease      Electronically signed by: Dung Hinkle  Date:    01/22/2023  Time:    11:27               Narrative:    EXAMINATION:  XR CHEST PA  AND LATERAL    CLINICAL HISTORY:  Cough, unspecified    TECHNIQUE:  XR CHEST PA AND LATERAL    COMPARISON:  2018    FINDINGS:  No lines or tubes.    Lungs are clear.    Normal pleura.    Cardiac silhouette is normal    No obvious acute bone findings.                                       Medications - No data to display  Medical Decision Making:   Clinical Tests:   Lab Tests: Ordered and Reviewed  The following lab test(s) were unremarkable: CBC and CMP       <> Summary of Lab: RSS/FLU swab negative  Radiological Study: Ordered and Reviewed  ED Management:  Rapid strep swab negative, throat culture pending. Patient has oral candidiasis that will be treated with nystatin swish and swallow. I believe her cough is related to recent diagnoses of COVID and PND.  Will treat sinusitis with Augmentin, OTC Flonase and Mucinex DM. No wheezing noted while in the ED. CXR revealed no acute cardiopulmonary process. Refilled her prescription for Ventolin HFA.  Reviewed discharge instructions and copy of AVS given to patient. She agreed to treatment plan and verbalized understanding.                  Clinical Impression:   Final diagnoses:  [R05.9] Cough  [J32.0] Maxillary sinusitis, unspecified chronicity (Primary)  [B37.0] Oral candidiasis        ED Disposition Condition    Discharge Stable          ED Prescriptions       Medication Sig Dispense Start Date End Date Auth. Provider    amoxicillin-clavulanate 875-125mg (AUGMENTIN) 875-125 mg per tablet Take 1 tablet by mouth 2 (two) times daily. 20 tablet 1/22/2023 -- JESUS Venegas    nystatin (MYCOSTATIN) 100,000 unit/mL suspension Take 6 mLs (600,000 Units total) by mouth 4 (four) times daily. Swish and swallow for 10 days 240 mL 1/22/2023 2/1/2023 JESUS Venegas    albuterol (VENTOLIN HFA) 90 mcg/actuation inhaler Inhale 2 puffs into the lungs every 6 (six) hours as needed for Wheezing. Rescue 18 g 1/22/2023 1/22/2024 JESUS Venegas          Follow-up  Information       Follow up With Specialties Details Why Contact Info    Siri Davis, [facility] Family Medicine Call in 1 day To schedule follow up if symptoms do not improve [address]  [place] [address] [phone]               Bibi Abrams, JESUS  01/22/23 7728

## 2023-01-22 NOTE — DISCHARGE INSTRUCTIONS
-Take Augmentin as directed for sinusitis and complete.  -Use Flonase nasal spray over the counter as directed for nasal congestion.  -Take Mucinex DM over the counter as directed for cough and congestion.  -Use Ventolin HFA 2 puffs every 6 hours as needed  -Alternate Motrin and Tylenol as needed for fever  - Increase fluids and rest.  -Take Nystatin as directed for thrush.

## 2023-01-22 NOTE — Clinical Note
"Salina "Salina" Tenzin was seen and treated in our emergency department on 1/22/2023.  She may return to work on 01/24/2023.       If you have any questions or concerns, please don't hesitate to call.      JESUS Venegas"

## 2023-01-22 NOTE — ED TRIAGE NOTES
PT ARRIVED TO ER WITH C/O SORE THROAT, COUGH, HURTS TO TAKE DEEP BREATH. PT STATES HAD COVID 2 WEEKS AGO AND WAS GIVEN ZPK AND ZYRTEC BUT HASN'T GOT BETTER.

## 2023-01-26 ENCOUNTER — TELEPHONE (OUTPATIENT)
Dept: EMERGENCY MEDICINE | Facility: HOSPITAL | Age: 48
End: 2023-01-26
Payer: COMMERCIAL

## 2023-01-26 LAB — DEPRECATED S PYO AG THROAT QL EIA: NORMAL

## 2023-01-27 ENCOUNTER — TELEPHONE (OUTPATIENT)
Dept: EMERGENCY MEDICINE | Facility: HOSPITAL | Age: 48
End: 2023-01-27
Payer: COMMERCIAL

## 2023-05-25 ENCOUNTER — HOSPITAL ENCOUNTER (EMERGENCY)
Facility: HOSPITAL | Age: 48
Discharge: HOME | End: 2023-05-25
Payer: COMMERCIAL

## 2023-05-25 VITALS
RESPIRATION RATE: 18 BRPM | HEIGHT: 69 IN | BODY MASS INDEX: 43.4 KG/M2 | SYSTOLIC BLOOD PRESSURE: 134 MMHG | TEMPERATURE: 98 F | HEART RATE: 86 BPM | WEIGHT: 293 LBS | OXYGEN SATURATION: 99 % | DIASTOLIC BLOOD PRESSURE: 75 MMHG

## 2023-05-25 DIAGNOSIS — M25.512 ACUTE PAIN OF LEFT SHOULDER: ICD-10-CM

## 2023-05-25 DIAGNOSIS — S49.92XA SHOULDER INJURY, LEFT, INITIAL ENCOUNTER: Primary | ICD-10-CM

## 2023-05-25 PROCEDURE — 99284 PR EMERGENCY DEPT VISIT,LEVEL IV: ICD-10-PCS | Mod: ,,, | Performed by: NURSE PRACTITIONER

## 2023-05-25 PROCEDURE — 63600175 PHARM REV CODE 636 W HCPCS: Performed by: NURSE PRACTITIONER

## 2023-05-25 PROCEDURE — 96372 THER/PROPH/DIAG INJ SC/IM: CPT | Performed by: NURSE PRACTITIONER

## 2023-05-25 PROCEDURE — 99284 EMERGENCY DEPT VISIT MOD MDM: CPT | Mod: ,,, | Performed by: NURSE PRACTITIONER

## 2023-05-25 PROCEDURE — 99284 EMERGENCY DEPT VISIT MOD MDM: CPT

## 2023-05-25 RX ORDER — KETOROLAC TROMETHAMINE 30 MG/ML
60 INJECTION, SOLUTION INTRAMUSCULAR; INTRAVENOUS
Status: COMPLETED | OUTPATIENT
Start: 2023-05-25 | End: 2023-05-25

## 2023-05-25 RX ADMIN — KETOROLAC TROMETHAMINE 60 MG: 30 INJECTION, SOLUTION INTRAMUSCULAR; INTRAVENOUS at 11:05

## 2023-05-25 NOTE — ED PROVIDER NOTES
"Encounter Date: 2023       History     Chief Complaint   Patient presents with    Shoulder Pain     left    Arm Pain     Left     49 y/o AAF presents to the ED with complaint of left shoulder pain that started yesterday after pulling on patient to keep her from falling. States she felt "a pull" in shoulder at the time of injury. Describes pain as throbbing type pain, Pain worse with movement of shoulder, rates pain an 8/10. Has not taken anything for pain. Denies numbness or tingling.  LMP: Hysterectomy    The history is provided by the patient.   Review of patient's allergies indicates:   Allergen Reactions    Tramadol Rash     Past Medical History:   Diagnosis Date    Asthma     Diabetes mellitus     Gout, unspecified     Thyroid disease      Past Surgical History:   Procedure Laterality Date    FINGER AMPUTATION Left     PARTIAL INDEX FINGER    HYSTERECTOMY      PARTIAL    OPEN REDUCTION AND INTERNAL FIXATION (ORIF) OF INJURY OF ANKLE Right      Family History   Family history unknown: Yes     Social History     Tobacco Use    Smoking status: Former     Types: Cigarettes     Quit date:      Years since quittin.4    Smokeless tobacco: Never   Substance Use Topics    Alcohol use: Never    Drug use: Never     Review of Systems   Constitutional: Negative.  Negative for activity change, appetite change and fever.   HENT: Negative.  Negative for congestion, ear pain, sore throat and trouble swallowing.    Eyes: Negative.    Respiratory: Negative.  Negative for cough, chest tightness and shortness of breath.    Cardiovascular: Negative.  Negative for chest pain, palpitations and leg swelling.   Gastrointestinal: Negative.  Negative for abdominal pain, constipation, diarrhea, nausea and vomiting.   Genitourinary: Negative.  Negative for difficulty urinating and dysuria.   Musculoskeletal:  Positive for arthralgias (left shoulder). Negative for gait problem, neck pain and neck stiffness.   Skin: Negative.  " Negative for rash.   Neurological:  Negative for dizziness, weakness, numbness and headaches.   Hematological: Negative.  Negative for adenopathy.   Psychiatric/Behavioral: Negative.  Negative for agitation, behavioral problems and sleep disturbance. The patient is not nervous/anxious.      Physical Exam     Initial Vitals [05/25/23 1057]   BP Pulse Resp Temp SpO2   134/75 86 18 97.9 °F (36.6 °C) 99 %      MAP       --         Physical Exam    Nursing note and vitals reviewed.  Constitutional: Vital signs are normal. She appears well-developed and well-nourished. She is Obese . She is cooperative. She does not have a sickly appearance. She does not appear ill. No distress.   HENT:   Head: Normocephalic and atraumatic.   Right Ear: External ear normal.   Left Ear: External ear normal.   Nose: Nose normal.   Mouth/Throat: Mucous membranes are normal.   Eyes: Pupils are equal, round, and reactive to light.   Neck: Neck supple.   Normal range of motion.  Cardiovascular:  Normal rate, regular rhythm, normal heart sounds and intact distal pulses.           Pulses:       Radial pulses are 2+ on the right side and 2+ on the left side.   Pulmonary/Chest: Effort normal and breath sounds normal. No respiratory distress.   Abdominal: Abdomen is soft. Bowel sounds are normal. There is no abdominal tenderness. There is no guarding.   Musculoskeletal:      Right shoulder: Normal.      Left shoulder: Tenderness and bony tenderness present. No swelling, deformity or crepitus. Decreased range of motion. Normal strength. Normal pulse.      Cervical back: Normal range of motion and neck supple. No edema, erythema or rigidity. No spinous process tenderness or muscular tenderness. Normal range of motion.      Comments:  Tenderness with palpation over anterior aspect of shoulder. Patient has limited abduction, internal and external rotation due to pain.     Lymphadenopathy:     She has no cervical adenopathy.   Neurological: She is alert  and oriented to person, place, and time. She has normal strength. No cranial nerve deficit or sensory deficit.   Skin: Skin is warm and dry. Capillary refill takes less than 2 seconds.   Psychiatric: She has a normal mood and affect. Her speech is normal and behavior is normal. Thought content normal.       Medical Screening Exam   See Full Note    ED Course   Procedures  Labs Reviewed - No data to display       Imaging Results              X-Ray Shoulder 2 or More Views Left (Final result)  Result time 05/25/23 11:27:08      Final result by Jaren Vital DO (05/25/23 11:27:08)                   Impression:      As above.    Point of Service: [facility]      Electronically signed by: Jaren Vital  Date:    05/25/2023  Time:    11:27               Narrative:    EXAMINATION:  XR SHOULDER COMPLETE 2 OR MORE VIEWS LEFT    CLINICAL HISTORY:  left shoulder injury;    COMPARISON:  None    TECHNIQUE:  Frontal views of the left shoulder were obtained in internal and external rotation as well as a scapular Y-view of this shoulder .    FINDINGS:  Mild degenerative change of the acromioclavicular and glenohumeral joints.  No convincing acute fracture or dislocation demonstrated. No concerning radiopaque foreign body visualized.                                       Medications   ketorolac injection 60 mg (60 mg Intramuscular Given 5/25/23 1118)     Medical Decision Making:   Initial Assessment:   Received patient with normal vital signs. Left shoulder tender with palpation over anterior aspect of shoulder. Limited ROM due to pain. No neurovascular compromise noted. Remainder of physical exam wnl. NAD noted.  Differential Diagnosis:   -Left shoulder strain  -Acromioclavicular injury  -Subacromial impingement  -Rotator cuff tears  -Arthritis  -Bursitis      ED Management:  -left shoulder x-ray revealed mild degenerative change of the acromioclavicular and glenohumeral joints.  No convincing acute fracture or  dislocation demonstrated. No concerning radiopaque foreign body visualized.    Discharge MDM  I discussed x-ray results with patient.  Patient was managed in the ED with Toradol 60 mg IM x1.    The response to treatment was pain improved down to a 5. Increase Indomethacin 50 mg to twice a day. May take Tylenol 500 mg 2 tabs by mouth every 8 hours in between Indomethacin if needed for pain. May use ice pack 10-15 min, then  of 20 minutes. Use through out the day to help with pain and inflammation. Follow up with PCP for recheck. If s/s do not improve may consider physical therapy and/or MRI.     Patient was discharged in stable condition.  Detailed return precautions discussed. Patient agreed to treatment plan and verbalized understanding.                       Clinical Impression:   Final diagnoses:  [S49.92XA] Shoulder injury, left, initial encounter (Primary)  [M25.512] Acute pain of left shoulder        ED Disposition Condition    Discharge Stable          ED Prescriptions    None       Follow-up Information       Follow up With Specialties Details Why Contact Info    [facility] Call today Schedule follow up in 2-3 days or sooner if symptoms become worse. [address]  [place] [id number] [phone]               Bibi Abrams, JESUS  05/25/23 8866

## 2023-05-25 NOTE — Clinical Note
"Salina "Salina" Tenzin was seen and treated in our emergency department on 5/25/2023.  She may return to work on 05/27/2023.       If you have any questions or concerns, please don't hesitate to call.      JESUS Venegas"

## 2023-05-26 ENCOUNTER — TELEPHONE (OUTPATIENT)
Dept: EMERGENCY MEDICINE | Facility: HOSPITAL | Age: 48
End: 2023-05-26
Payer: COMMERCIAL

## 2023-05-26 NOTE — TELEPHONE ENCOUNTER
Reports is still hurting and is taking meds as directed.  Using ice and heat and bio freeze.  States has not made a follow up appointment because her doctor is no longer taking her insurance.  Inst. To call company for a list of local providers that accept her insurance and schedule a follow up.

## 2023-08-13 ENCOUNTER — HOSPITAL ENCOUNTER (EMERGENCY)
Facility: HOSPITAL | Age: 48
Discharge: HOME | End: 2023-08-13
Attending: FAMILY MEDICINE
Payer: COMMERCIAL

## 2023-08-13 VITALS
BODY MASS INDEX: 43.4 KG/M2 | OXYGEN SATURATION: 98 % | HEART RATE: 97 BPM | TEMPERATURE: 98 F | HEIGHT: 69 IN | DIASTOLIC BLOOD PRESSURE: 84 MMHG | RESPIRATION RATE: 20 BRPM | SYSTOLIC BLOOD PRESSURE: 150 MMHG | WEIGHT: 293 LBS

## 2023-08-13 DIAGNOSIS — B34.9 VIRAL INFECTION: Primary | ICD-10-CM

## 2023-08-13 LAB
FLUAV AG UPPER RESP QL IA.RAPID: NEGATIVE
FLUBV AG UPPER RESP QL IA.RAPID: NEGATIVE
SARS-COV-2 RDRP RESP QL NAA+PROBE: NEGATIVE

## 2023-08-13 PROCEDURE — 99284 EMERGENCY DEPT VISIT MOD MDM: CPT | Mod: ,,, | Performed by: FAMILY MEDICINE

## 2023-08-13 PROCEDURE — 99284 PR EMERGENCY DEPT VISIT,LEVEL IV: ICD-10-PCS | Mod: ,,, | Performed by: FAMILY MEDICINE

## 2023-08-13 PROCEDURE — 87804 INFLUENZA ASSAY W/OPTIC: CPT | Performed by: FAMILY MEDICINE

## 2023-08-13 PROCEDURE — 63600175 PHARM REV CODE 636 W HCPCS: Performed by: FAMILY MEDICINE

## 2023-08-13 PROCEDURE — 99284 EMERGENCY DEPT VISIT MOD MDM: CPT | Mod: 25

## 2023-08-13 PROCEDURE — 96372 THER/PROPH/DIAG INJ SC/IM: CPT | Performed by: FAMILY MEDICINE

## 2023-08-13 PROCEDURE — 87635 SARS-COV-2 COVID-19 AMP PRB: CPT | Performed by: FAMILY MEDICINE

## 2023-08-13 RX ORDER — DEXAMETHASONE SODIUM PHOSPHATE 4 MG/ML
4 INJECTION, SOLUTION INTRA-ARTICULAR; INTRALESIONAL; INTRAMUSCULAR; INTRAVENOUS; SOFT TISSUE
Status: COMPLETED | OUTPATIENT
Start: 2023-08-13 | End: 2023-08-13

## 2023-08-13 RX ORDER — METHYLPREDNISOLONE ACETATE 40 MG/ML
40 INJECTION, SUSPENSION INTRA-ARTICULAR; INTRALESIONAL; INTRAMUSCULAR; SOFT TISSUE
Status: COMPLETED | OUTPATIENT
Start: 2023-08-13 | End: 2023-08-13

## 2023-08-13 RX ADMIN — METHYLPREDNISOLONE ACETATE 40 MG: 40 INJECTION, SUSPENSION INTRA-ARTICULAR; INTRALESIONAL; INTRAMUSCULAR; INTRASYNOVIAL; SOFT TISSUE at 02:08

## 2023-08-13 RX ADMIN — DEXAMETHASONE SODIUM PHOSPHATE 4 MG: 4 INJECTION, SOLUTION INTRAMUSCULAR; INTRAVENOUS at 02:08

## 2023-08-13 NOTE — ED TRIAGE NOTES
Presents with c/o fever, vomiting, diarrhea and cough that started again yesterday.  Was sick last weekend and was tested for Covid on Monday but was negative.  States got to feeling much better but is now sick again.

## 2023-08-15 ENCOUNTER — TELEPHONE (OUTPATIENT)
Dept: EMERGENCY MEDICINE | Facility: HOSPITAL | Age: 48
End: 2023-08-15
Payer: COMMERCIAL

## 2023-08-15 NOTE — TELEPHONE ENCOUNTER
Patient continue to have fever. Encourage to continue with Tylenol and Motrin alternately. Voiced understanding

## 2023-09-01 NOTE — ED PROVIDER NOTES
Encounter Date: 2023       History     Chief Complaint   Patient presents with    COVID-19 Concerns     +fever, vomiting and dirrhea started yesterday. Pt tested for covid at [facility] earlier this week but was negative. Got better but now feel worse.     The history is provided by the patient.     Review of patient's allergies indicates:   Allergen Reactions    Tramadol Rash     Past Medical History:   Diagnosis Date    Asthma     Diabetes mellitus     Gout, unspecified     Thyroid disease      Past Surgical History:   Procedure Laterality Date    FINGER AMPUTATION Left     PARTIAL INDEX FINGER    HYSTERECTOMY      PARTIAL    OPEN REDUCTION AND INTERNAL FIXATION (ORIF) OF INJURY OF ANKLE Right      Family History   Family history unknown: Yes     Social History     Tobacco Use    Smoking status: Former     Current packs/day: 0.00     Types: Cigarettes     Quit date:      Years since quittin.6    Smokeless tobacco: Never   Substance Use Topics    Alcohol use: Never    Drug use: Never     Review of Systems   Constitutional:  Positive for fever.   Gastrointestinal:  Positive for diarrhea, nausea and vomiting.       Physical Exam     Initial Vitals [23 1150]   BP Pulse Resp Temp SpO2   (!) 150/84 97 20 98.4 °F (36.9 °C) 98 %      MAP       --         Physical Exam    Constitutional: She appears well-developed and well-nourished.   HENT:   Head: Normocephalic and atraumatic.   Eyes: EOM are normal. Pupils are equal, round, and reactive to light.   Neck: Neck supple.   Normal range of motion.  Cardiovascular:  Normal rate and regular rhythm.           Pulmonary/Chest: Breath sounds normal.   Abdominal: Abdomen is soft.   Musculoskeletal:         General: Normal range of motion.      Cervical back: Normal range of motion and neck supple.     Neurological: She is alert and oriented to person, place, and time.   Skin: Skin is warm.         Medical Screening Exam   See Full Note    ED Course   Procedures  Labs  Reviewed   RAPID INFLUENZA A/B - Normal   SARS-COV-2 RNA AMPLIFICATION, QUAL - Normal    Narrative:     Negative SARS-CoV results should not be used as the sole basis for treatment or patient management decisions; negative results should be considered in the context of a patient's recent exposures, history and the presene of clinical signs and symptoms consistent with COVID-19.  Negative results should be treated as presumptive and confirmed by molecular assay, if necessary for patient management.          Imaging Results              X-Ray Chest AP Portable (Final result)  Result time 08/13/23 14:15:34      Final result by Anand Whiting MD (08/13/23 14:15:34)                   Impression:      No acute findings.      Electronically signed by: Anand Whiting  Date:    08/13/2023  Time:    14:15               Narrative:    EXAMINATION:  XR CHEST AP PORTABLE    CLINICAL HISTORY:  COUGH;    TECHNIQUE:  Single frontal view of the chest was performed.    COMPARISON:  01/22/2023    FINDINGS:  Heart size normal. Lungs clear. No pneumothorax or pleural effusion.                                       Medications   dexAMETHasone injection 4 mg (4 mg Intramuscular Given 8/13/23 1428)   methylPREDNISolone acetate injection 40 mg (40 mg Intramuscular Given 8/13/23 1427)     Medical Decision Making  Covid like symptoms.  Negative at home.   Felt worse.   Covid negative in ER.      Amount and/or Complexity of Data Reviewed  Radiology: ordered.    Risk  Prescription drug management.                               Clinical Impression:   Final diagnoses:  [B34.9] Viral infection (Primary)        ED Disposition Condition    Discharge Stable          ED Prescriptions    None       Follow-up Information       Follow up With Specialties Details Why Contact Info    Siri Davis, RADHAP-C [address] [address] [phone]               Lori Lomeli MD  08/31/23 9348

## 2024-04-22 ENCOUNTER — HOSPITAL ENCOUNTER (OUTPATIENT)
Dept: RADIOLOGY | Facility: HOSPITAL | Age: 49
Discharge: HOME | End: 2024-04-22
Attending: NURSE PRACTITIONER
Payer: COMMERCIAL

## 2024-04-22 DIAGNOSIS — M62.830 BACK MUSCLE SPASM: ICD-10-CM

## 2024-04-22 PROCEDURE — 72110 X-RAY EXAM L-2 SPINE 4/>VWS: CPT | Mod: TC

## 2024-05-01 DIAGNOSIS — M51.36 DDD (DEGENERATIVE DISC DISEASE), LUMBAR: Primary | ICD-10-CM

## 2024-05-01 DIAGNOSIS — M62.830 LUMBAR PARASPINAL MUSCLE SPASM: ICD-10-CM

## 2024-05-08 DIAGNOSIS — M54.16 LUMBAR RADICULOPATHY: Primary | ICD-10-CM

## 2024-05-10 ENCOUNTER — OFFICE VISIT (OUTPATIENT)
Dept: FAMILY MEDICINE | Facility: CLINIC | Age: 49
End: 2024-05-10
Payer: COMMERCIAL

## 2024-05-10 VITALS
DIASTOLIC BLOOD PRESSURE: 76 MMHG | SYSTOLIC BLOOD PRESSURE: 135 MMHG | BODY MASS INDEX: 43.4 KG/M2 | HEIGHT: 69 IN | TEMPERATURE: 99 F | HEART RATE: 93 BPM | OXYGEN SATURATION: 97 % | WEIGHT: 293 LBS | RESPIRATION RATE: 14 BRPM

## 2024-05-10 DIAGNOSIS — J01.90 ACUTE SINUSITIS, RECURRENCE NOT SPECIFIED, UNSPECIFIED LOCATION: ICD-10-CM

## 2024-05-10 DIAGNOSIS — R50.9 FEBRILE ILLNESS: Primary | ICD-10-CM

## 2024-05-10 DIAGNOSIS — J03.90 ACUTE TONSILLITIS, UNSPECIFIED ETIOLOGY: ICD-10-CM

## 2024-05-10 PROBLEM — E03.9 HYPOTHYROIDISM: Status: ACTIVE | Noted: 2019-11-17

## 2024-05-10 PROBLEM — E11.9 TYPE 2 DIABETES MELLITUS WITHOUT COMPLICATION: Status: ACTIVE | Noted: 2019-11-17

## 2024-05-10 LAB
CTP QC/QA: YES
MOLECULAR STREP A: NEGATIVE
POC MOLECULAR INFLUENZA A AGN: NEGATIVE
POC MOLECULAR INFLUENZA B AGN: NEGATIVE
SARS-COV-2 RDRP RESP QL NAA+PROBE: NEGATIVE

## 2024-05-10 PROCEDURE — 1160F RVW MEDS BY RX/DR IN RCRD: CPT | Mod: CPTII,,, | Performed by: NURSE PRACTITIONER

## 2024-05-10 PROCEDURE — 87502 INFLUENZA DNA AMP PROBE: CPT | Mod: QW,,, | Performed by: NURSE PRACTITIONER

## 2024-05-10 PROCEDURE — 3008F BODY MASS INDEX DOCD: CPT | Mod: CPTII,,, | Performed by: NURSE PRACTITIONER

## 2024-05-10 PROCEDURE — 87651 STREP A DNA AMP PROBE: CPT | Mod: QW,,, | Performed by: NURSE PRACTITIONER

## 2024-05-10 PROCEDURE — 87635 SARS-COV-2 COVID-19 AMP PRB: CPT | Mod: QW,,, | Performed by: NURSE PRACTITIONER

## 2024-05-10 PROCEDURE — 99204 OFFICE O/P NEW MOD 45 MIN: CPT | Mod: 25,,, | Performed by: NURSE PRACTITIONER

## 2024-05-10 PROCEDURE — 1159F MED LIST DOCD IN RCRD: CPT | Mod: CPTII,,, | Performed by: NURSE PRACTITIONER

## 2024-05-10 PROCEDURE — 96372 THER/PROPH/DIAG INJ SC/IM: CPT | Mod: ,,, | Performed by: NURSE PRACTITIONER

## 2024-05-10 PROCEDURE — 3078F DIAST BP <80 MM HG: CPT | Mod: CPTII,,, | Performed by: NURSE PRACTITIONER

## 2024-05-10 PROCEDURE — 3075F SYST BP GE 130 - 139MM HG: CPT | Mod: CPTII,,, | Performed by: NURSE PRACTITIONER

## 2024-05-10 RX ORDER — POTASSIUM CHLORIDE 750 MG/1
10 TABLET, EXTENDED RELEASE ORAL DAILY
COMMUNITY
Start: 2024-04-22

## 2024-05-10 RX ORDER — CETIRIZINE HYDROCHLORIDE 10 MG/1
10 TABLET ORAL NIGHTLY
Qty: 90 TABLET | Refills: 1 | Status: SHIPPED | OUTPATIENT
Start: 2024-05-10

## 2024-05-10 RX ORDER — AZITHROMYCIN 250 MG/1
TABLET, FILM COATED ORAL
Qty: 6 TABLET | Refills: 0 | Status: SHIPPED | OUTPATIENT
Start: 2024-05-10 | End: 2024-05-15

## 2024-05-10 RX ORDER — DEXAMETHASONE SODIUM PHOSPHATE 4 MG/ML
4 INJECTION, SOLUTION INTRA-ARTICULAR; INTRALESIONAL; INTRAMUSCULAR; INTRAVENOUS; SOFT TISSUE
Status: COMPLETED | OUTPATIENT
Start: 2024-05-10 | End: 2024-05-10

## 2024-05-10 RX ORDER — METHYLPREDNISOLONE ACETATE 40 MG/ML
40 INJECTION, SUSPENSION INTRA-ARTICULAR; INTRALESIONAL; INTRAMUSCULAR; SOFT TISSUE ONCE
Status: COMPLETED | OUTPATIENT
Start: 2024-05-10 | End: 2024-05-10

## 2024-05-10 RX ORDER — METHOCARBAMOL 750 MG/1
750 TABLET, FILM COATED ORAL EVERY 12 HOURS
COMMUNITY
Start: 2024-04-22

## 2024-05-10 RX ADMIN — METHYLPREDNISOLONE ACETATE 40 MG: 40 INJECTION, SUSPENSION INTRA-ARTICULAR; INTRALESIONAL; INTRAMUSCULAR; SOFT TISSUE at 01:05

## 2024-05-10 RX ADMIN — DEXAMETHASONE SODIUM PHOSPHATE 4 MG: 4 INJECTION, SOLUTION INTRA-ARTICULAR; INTRALESIONAL; INTRAMUSCULAR; INTRAVENOUS; SOFT TISSUE at 01:05

## 2024-05-10 NOTE — PATIENT INSTRUCTIONS
Please bring ALL medications bottles (from ALL providers) including over-the-counter medications to your next appointment !!!       Sinusitis   Try to thin the mucus.  Drink lots of liquids to stay hydrated.  Use a cool mist humidifier to avoid dry air.  Use saline nose drops or a saline nose rinse to relieve stuffiness.  Wash your hands often. This will help keep others healthy.  Do not smoke or be in smoke-filled places. Avoid things that may cause breathing problems like fumes, pollution, dust, and other common allergens and irritants.  You may want to take medicine like ibuprofen, naproxen, or acetaminophen to help with pain.    Sore Throat  To help ease a sore throat you can:  Use a sore throat spray.  Suck on hard candy or throat lozenges.  Gargle with warm saltwater a few times each day. Mix of 1/4 teaspoon (1.25 grams) salt in 8 ounces (240 mL) of warm water.  Use a cool mist humidifier to help you breathe easier.  You may want to take medicine like acetaminophen, ibuprofen, or naproxen for pain.  If you decide to take over-the-counter cough or cold medicines, follow the directions on the label carefully. Be sure you do not take more than one medicine that contains acetaminophen. Wash your hands often. This will help keep others healthy.

## 2024-05-10 NOTE — PROGRESS NOTES
[facility]    Marcela Lovelace AGPCNP-BC  [facility] [address] [place] [address]  [phone]          PATIENT NAME: Salina Dave  : 1975  DATE: 5/10/24  MRN: [id number]          Reason for Visit        Chief Complaint   Patient presents with    Dysphagia     Pt is here with difficulty swallowing. She states she has been having sinus drainage for the last couple of days and now it is unbearable. She has tried tylenol cold and sinus with no relief.     Fever     States she had a fever this morning. She also complains of right sided ear pain and states all the drainage feels like its on that side. Pt took tylenol at about 7 am for the fever.        History of Present Illness      Salina Dave is a 49 y.o. female with chronic conditions of Hypothyroidism, DM Type 2, Hysterectomy Status, Lumbar Radiculopathy, DDD Lumbar Spine, GOUT, and Morbid Obesity who presents today as a walking for trouble swallowing from sinus drainage that started on 2024. She reports taking Tylenol Cold and Sinus, but it got worse to the point where she was having trouble swallowing from sore throat. She reports having fever of 101 this morning. She reports taking Tylenol. Rapid Strep, Covid, and Influenza A/B all negative.       MEDICAL / SURGICAL / SOCIAL HISTORY     Past Medical History:   Diagnosis Date    Asthma     Diabetes mellitus     Gout, unspecified     Thyroid disease        Past Surgical History:   Procedure Laterality Date    FINGER AMPUTATION Left     PARTIAL INDEX FINGER    HYSTERECTOMY      PARTIAL    OPEN REDUCTION AND INTERNAL FIXATION (ORIF) OF INJURY OF ANKLE Right        Social History     Tobacco Use    Smoking status: Former     Current packs/day: 0.00     Types: Cigarettes     Quit date:      Years since quittin.3    Smokeless tobacco: Never   Substance Use Topics    Alcohol use: Never    Drug use:  "Never         I personally reviewed all past medical, surgical, and social.     Review of Systems   Constitutional:  Positive for fever (T max 101 this morning treated with Tylenol). Negative for chills.   HENT:  Positive for ear pain ("i wanna tear my right ear off"), postnasal drip, rhinorrhea, sinus pressure and sore throat. Negative for congestion.    Respiratory:  Positive for cough ("just when they start draining a lot"). Negative for shortness of breath.    Cardiovascular:  Negative for chest pain.   Gastrointestinal:  Positive for nausea (yesterday and this morning).   Musculoskeletal:  Positive for arthralgias and myalgias.   Neurological:  Positive for headaches. Negative for dizziness.        MEDICATIONS / ALLERGIES / HM     Current Outpatient Medications   Medication Sig Dispense Refill    glipiZIDE (GLUCOTROL) 10 MG tablet Take 10 mg by mouth 2 (two) times daily before meals.      metFORMIN (GLUCOPHAGE) 500 MG tablet Take 500 mg by mouth 2 (two) times daily with meals.      methocarbamoL (ROBAXIN) 750 MG Tab Take 750 mg by mouth every 12 (twelve) hours.      potassium chloride (KLOR-CON) 10 MEQ TbSR Take 10 mEq by mouth once daily.      azithromycin (Z-MICHEL) 250 MG tablet Take 2 tablets by mouth on day 1; Take 1 tablet by mouth on days 2-5 6 tablet 0    cetirizine (ZYRTEC) 10 MG tablet Take 1 tablet (10 mg total) by mouth every evening. 90 tablet 1    INDOMETHACIN ORAL Take by mouth. TAKE 2 CAPS DAILY  PT DOESN'T KNOW DOSE       No current facility-administered medications for this visit.       Review of patient's allergies indicates:   Allergen Reactions    Tramadol Rash         There is no immunization history on file for this patient.     Health Maintenance   Topic Date Due    Hepatitis C Screening  Never done    Lipid Panel  Never done    TETANUS VACCINE  Never done    Mammogram  Never done    Colorectal Cancer Screening  Never done    Shingles Vaccine (1 of 2) Never done        Physical Exam " "    Physical Exam  Constitutional:       Appearance: She is obese. She is ill-appearing.   Cardiovascular:      Rate and Rhythm: Normal rate and regular rhythm.      Pulses: Normal pulses.      Heart sounds: Normal heart sounds.   Pulmonary:      Effort: Pulmonary effort is normal.      Breath sounds: Normal breath sounds.   Abdominal:      Palpations: Abdomen is soft.      Tenderness: There is no abdominal tenderness.   Skin:     General: Skin is warm and dry.   Neurological:      Mental Status: She is alert and oriented to person, place, and time.   Psychiatric:         Mood and Affect: Mood normal.         Behavior: Behavior normal.          Laboratory:    Lab Results   Component Value Date     (H) 01/22/2023     01/22/2023    K 4.1 01/22/2023     01/22/2023    CO2 28 01/22/2023    BUN 9 01/22/2023    CREATININE 0.99 01/22/2023    CALCIUM 9.1 01/22/2023    PROT 7.9 01/22/2023    ALBUMIN 3.5 01/22/2023    BILITOT 0.6 01/22/2023    ALKPHOS 75 01/22/2023    AST 22 01/22/2023    ALT 33 01/22/2023    ANIONGAP 13 01/22/2023    ESTGFRAFRICA 67 12/19/2021       Lab Results   Component Value Date    WBC 7.69 01/22/2023    RBC 4.16 (L) 01/22/2023    HGB 12.3 01/22/2023    HCT 37.7 (L) 01/22/2023    MCV 90.6 01/22/2023    RDW 13.5 01/22/2023     01/22/2023        No results found for: "CHOL", "TRIG", "HDL", "LDLCALC", "TOTALCHOLEST"    No results found for: "TSH"    No results found for: "HGBA1C", "ESTIMATEDAVG"     No results found for: "[id number]"    No results found for: "[id number]"        Point Of Care Testing:    Nitrites, UA   Date Value Ref Range Status   10/04/2022 Positive (A) Negative Final     Urobilinogen, UA   Date Value Ref Range Status   10/04/2022 Normal 0.2, 1.0, Normal mg/dL Final     pH, UA   Date Value Ref Range Status   10/04/2022 5.5 5.0 to 8.0 pH Units Final     Specific Gravity, UA   Date Value Ref Range Status   10/04/2022 1.021 <=1.030 Final     Ketones, UA   Date " Value Ref Range Status   10/04/2022 Negative Negative mg/dL Final       Lab Results   Component Value Date    [id number] Negative 05/10/2024    FLUAMOLEC Negative 05/10/2024    FLUBMOLEC Negative 05/10/2024    MOLSTREPAPOC Negative 05/10/2024             Assessment/Plan     Febrile illness  -     POCT Influenza A/B Molecular-negative  -     POCT COVID-19 Rapid Screening-negative  -     POCT Strep A, Molecular-negative  -     monitor temp, alternate Tylenol and Ibuprofen as needed    Acute sinusitis, recurrence not specified, unspecified location  -     dexAMETHasone injection 4 mg  -     methylPREDNISolone acetate injection 40 mg  -     azithromycin (Z-MICHEL) 250 MG tablet; Take 2 tablets by mouth on day 1; Take 1 tablet by mouth on days 2-5  Dispense: 6 tablet; Refill: 0  -     cetirizine (ZYRTEC) 10 MG tablet; Take 1 tablet (10 mg total) by mouth every evening.  Dispense: 90 tablet; Refill: 1    Acute tonsillitis, unspecified etiology  -     POCT Strep A, Molecular-negative  -     dexAMETHasone injection 4 mg  -     methylPREDNISolone acetate injection 40 mg  -     azithromycin (Z-MICHEL) 250 MG tablet; Take 2 tablets by mouth on day 1; Take 1 tablet by mouth on days 2-5  Dispense: 6 tablet; Refill: 0    Sinusitis   Try to thin the mucus.  Drink lots of liquids to stay hydrated.  Use a cool mist humidifier to avoid dry air.  Use saline nose drops or a saline nose rinse to relieve stuffiness.  Wash your hands often. This will help keep others healthy.  Do not smoke or be in smoke-filled places. Avoid things that may cause breathing problems like fumes, pollution, dust, and other common allergens and irritants.  You may want to take medicine like ibuprofen, naproxen, or acetaminophen to help with pain.    Sore Throat  To help ease a sore throat you can:  Use a sore throat spray.  Suck on hard candy or throat lozenges.  Gargle with warm saltwater a few times each day. Mix of 1/4 teaspoon (1.25 grams) salt in 8 ounces  (240 mL) of warm water.  Use a cool mist humidifier to help you breathe easier.  You may want to take medicine like acetaminophen, ibuprofen, or naproxen for pain.  If you decide to take over-the-counter cough or cold medicines, follow the directions on the label carefully. Be sure you do not take more than one medicine that contains acetaminophen. Wash your hands often. This will help keep others healthy.    Future Appointments   Date Time Provider Department Center   6/18/2024 11:00 AM Marcela Lovelace NP [facility]     Will be due for labs (TSH, A1c, Lipid, Urine Micro, CBC, CMP, Vit D, Vit B12) and discussion of health maintenance issues.     Workup results were reviewed and all questions were answered. Diagnosis and treatment options were discussed and the patient  is amenable with the overall treatment plan. Verbal and written discharge instructions were given including to return to clinic/ED with any acute worsening of symptoms or failure of symptoms to improve. The reasons for return to the clinic/ED were explained in lay terms. No further intervention is warranted at this time. The patient agrees with the plan, expresses understanding, is hemodynamically stable and in no acute distress.     All questions answered to desired level of satisfaction          PACO Sheehan-BC [facility]

## 2024-07-05 ENCOUNTER — HOSPITAL ENCOUNTER (EMERGENCY)
Facility: HOSPITAL | Age: 49
Discharge: HOME | End: 2024-07-05
Payer: COMMERCIAL

## 2024-07-05 VITALS
DIASTOLIC BLOOD PRESSURE: 79 MMHG | HEIGHT: 69 IN | TEMPERATURE: 98 F | RESPIRATION RATE: 20 BRPM | WEIGHT: 293 LBS | SYSTOLIC BLOOD PRESSURE: 137 MMHG | OXYGEN SATURATION: 98 % | HEART RATE: 84 BPM | BODY MASS INDEX: 43.4 KG/M2

## 2024-07-05 DIAGNOSIS — R68.84 JAW PAIN: Primary | ICD-10-CM

## 2024-07-05 DIAGNOSIS — K04.7 DENTAL ABSCESS: ICD-10-CM

## 2024-07-05 PROCEDURE — 63600175 PHARM REV CODE 636 W HCPCS

## 2024-07-05 PROCEDURE — 99284 EMERGENCY DEPT VISIT MOD MDM: CPT | Mod: ,,,

## 2024-07-05 PROCEDURE — 25000003 PHARM REV CODE 250

## 2024-07-05 PROCEDURE — 96372 THER/PROPH/DIAG INJ SC/IM: CPT

## 2024-07-05 PROCEDURE — 99284 EMERGENCY DEPT VISIT MOD MDM: CPT | Mod: 25

## 2024-07-05 RX ORDER — AMOXICILLIN 875 MG/1
875 TABLET, FILM COATED ORAL 2 TIMES DAILY
Qty: 14 TABLET | Refills: 0 | Status: SHIPPED | OUTPATIENT
Start: 2024-07-05 | End: 2024-07-12

## 2024-07-05 RX ORDER — KETOROLAC TROMETHAMINE 30 MG/ML
30 INJECTION, SOLUTION INTRAMUSCULAR; INTRAVENOUS
Status: COMPLETED | OUTPATIENT
Start: 2024-07-05 | End: 2024-07-05

## 2024-07-05 RX ORDER — IBUPROFEN 800 MG/1
800 TABLET ORAL EVERY 6 HOURS PRN
Qty: 20 TABLET | Refills: 0 | Status: SHIPPED | OUTPATIENT
Start: 2024-07-05

## 2024-07-05 RX ORDER — CEFTRIAXONE 1 G/1
1 INJECTION, POWDER, FOR SOLUTION INTRAMUSCULAR; INTRAVENOUS
Status: COMPLETED | OUTPATIENT
Start: 2024-07-05 | End: 2024-07-05

## 2024-07-05 RX ORDER — LIDOCAINE HYDROCHLORIDE 10 MG/ML
2 INJECTION, SOLUTION EPIDURAL; INFILTRATION; INTRACAUDAL; PERINEURAL
Status: COMPLETED | OUTPATIENT
Start: 2024-07-05 | End: 2024-07-05

## 2024-07-05 RX ADMIN — CEFTRIAXONE SODIUM 1 G: 1 INJECTION, POWDER, FOR SOLUTION INTRAMUSCULAR; INTRAVENOUS at 10:07

## 2024-07-05 RX ADMIN — KETOROLAC TROMETHAMINE 30 MG: 30 INJECTION, SOLUTION INTRAMUSCULAR at 10:07

## 2024-07-05 RX ADMIN — LIDOCAINE HYDROCHLORIDE 20 MG: 10 INJECTION, SOLUTION EPIDURAL; INFILTRATION; INTRACAUDAL; PERINEURAL at 10:07

## 2024-07-06 NOTE — ED PROVIDER NOTES
Encounter Date: 2024       History     Chief Complaint   Patient presents with    Jaw Pain     Patient is a 50 y/o AAF with a PMHX of Asthma, DM, Gout, and Thyroid Disease presents to the ED POV with c/o right lower jaw pain. Patient stated that her current symptoms started today, denies any fever, trauma, and/or injury.     The history is provided by the patient.   Dental Pain  Primary symptoms do not include mouth pain. The symptoms began today. The symptoms are worsening. The symptoms are new. The symptoms occur constantly.   Additional symptoms include: jaw pain and facial swelling.     Review of patient's allergies indicates:   Allergen Reactions    Tramadol Rash     Past Medical History:   Diagnosis Date    Asthma     Diabetes mellitus     Gout, unspecified     Thyroid disease      Past Surgical History:   Procedure Laterality Date    FINGER AMPUTATION Left     PARTIAL INDEX FINGER    HYSTERECTOMY      PARTIAL    OPEN REDUCTION AND INTERNAL FIXATION (ORIF) OF INJURY OF ANKLE Right      Family History   Family history unknown: Yes     Social History     Tobacco Use    Smoking status: Former     Current packs/day: 0.00     Types: Cigarettes     Quit date:      Years since quittin.5    Smokeless tobacco: Never   Substance Use Topics    Alcohol use: Never    Drug use: Never     Review of Systems   Constitutional: Negative.    HENT:  Positive for dental problem and facial swelling.    Eyes: Negative.    Respiratory: Negative.     Cardiovascular: Negative.    Gastrointestinal: Negative.    Endocrine: Negative.    Genitourinary: Negative.    Musculoskeletal: Negative.    Skin: Negative.    Allergic/Immunologic: Negative.    Neurological: Negative.    Psychiatric/Behavioral: Negative.         Physical Exam     Initial Vitals [24 2215]   BP Pulse Resp Temp SpO2   137/79 84 20 97.8 °F (36.6 °C) 98 %      MAP       --         Physical Exam    Nursing note and vitals reviewed.  Constitutional: Vital  signs are normal. She appears well-developed and well-nourished. She is not diaphoretic. She is cooperative.  Non-toxic appearance. She does not have a sickly appearance. She does not appear ill. No distress.   HENT:   Mouth/Throat: Dental abscesses present.       Cardiovascular:  Normal rate, regular rhythm, S1 normal, S2 normal, normal heart sounds, intact distal pulses and normal pulses.  Frequent extrasystoles are present.          Pulmonary/Chest: Effort normal and breath sounds normal.     Lymphadenopathy:     She has no cervical adenopathy.     She has no axillary adenopathy.   Neurological: She is alert and oriented to person, place, and time. She has normal strength and normal reflexes. She displays normal reflexes. No cranial nerve deficit or sensory deficit. She displays a negative Romberg sign. GCS eye subscore is 4. GCS verbal subscore is 5. GCS motor subscore is 6.   Skin: Skin is warm, dry and intact. Capillary refill takes less than 2 seconds. No rash noted.   Psychiatric: She has a normal mood and affect. Her speech is normal and behavior is normal. Judgment and thought content normal. Cognition and memory are normal.         Medical Screening Exam   See Full Note    ED Course   Procedures  Labs Reviewed - No data to display       Imaging Results    None          Medications   ketorolac injection 30 mg (has no administration in time range)   cefTRIAXone injection 1 g (has no administration in time range)   LIDOcaine (PF) 10 mg/ml (1%) injection 20 mg (has no administration in time range)     Medical Decision Making  Patient is a 48 y/o AAF with a PMHX of Asthma, DM, Gout, and Thyroid Disease presents to the ED POV with c/o right lower jaw pain. Patient stated that her current symptoms started today, denies any fever, trauma, and/or injury.     The history is provided by the patient.   Dental Pain  Primary symptoms do not include mouth pain. The symptoms began today. The symptoms are worsening. The  symptoms are new. The symptoms occur constantly.   Additional symptoms include: jaw pain and facial swelling.       Risk  Prescription drug management.               ED Course as of 07/05/24 2235 Fri Jul 05, 2024 2233 Discharge instructions given along with strict return precautions, patient verbalizes understanding.   [AC]      ED Course User Index  [AC] Bao Morgan FNP                           Clinical Impression:   Final diagnoses:  [R68.84] Jaw pain (Primary)  [K04.7] Dental abscess        ED Disposition Condition    Discharge Stable          ED Prescriptions       Medication Sig Dispense Start Date End Date Auth. Provider    ibuprofen (ADVIL,MOTRIN) 800 MG tablet Take 1 tablet (800 mg total) by mouth every 6 (six) hours as needed for Pain. 20 tablet 7/5/2024 -- Bao Morgan FNP    amoxicillin (AMOXIL) 875 MG tablet Take 1 tablet (875 mg total) by mouth 2 (two) times daily. for 7 days 14 tablet 7/5/2024 7/12/2024 Bao Morgan FNP          Follow-up Information    None          Bao Morgan FNP  07/05/24 2236

## 2024-07-06 NOTE — DISCHARGE INSTRUCTIONS
- Take medication as directed  - Follow up with local Dentist as needed   - The examination and treatment you have received in the Emergency Department today have been rendered on an emergency basis only and are not intended to be a substitute for an effort to provide complete medical care. You should contact your follow-up physician as it is important that you let him or her check you and report any new or remaining problems since it is impossible to recognize and treat all elements of an injury or illness in a single emergency care center visit.

## 2024-07-06 NOTE — ED TRIAGE NOTES
Rec'd ambulatory 50 y/o F to triage w/ c/o bottom right jaw pain, swelling that started earlier today.

## 2024-07-12 ENCOUNTER — OFFICE VISIT (OUTPATIENT)
Dept: SPINE | Facility: CLINIC | Age: 49
End: 2024-07-12
Payer: COMMERCIAL

## 2024-07-12 ENCOUNTER — HOSPITAL ENCOUNTER (OUTPATIENT)
Dept: RADIOLOGY | Facility: HOSPITAL | Age: 49
Discharge: HOME | End: 2024-07-12
Attending: ORTHOPAEDIC SURGERY
Payer: COMMERCIAL

## 2024-07-12 DIAGNOSIS — M54.16 LUMBAR RADICULOPATHY: ICD-10-CM

## 2024-07-12 DIAGNOSIS — M43.16 SPONDYLOLISTHESIS OF LUMBAR REGION: Primary | ICD-10-CM

## 2024-07-12 PROCEDURE — 99999 PR PBB SHADOW E&M-EST. PATIENT-LVL III: CPT | Mod: PBBFAC,,, | Performed by: ORTHOPAEDIC SURGERY

## 2024-07-12 PROCEDURE — 99213 OFFICE O/P EST LOW 20 MIN: CPT | Mod: PBBFAC,25 | Performed by: ORTHOPAEDIC SURGERY

## 2024-07-12 PROCEDURE — 72110 X-RAY EXAM L-2 SPINE 4/>VWS: CPT | Mod: 26,,, | Performed by: ORTHOPAEDIC SURGERY

## 2024-07-12 PROCEDURE — 72110 X-RAY EXAM L-2 SPINE 4/>VWS: CPT | Mod: TC

## 2024-07-12 RX ORDER — GABAPENTIN 300 MG/1
300 CAPSULE ORAL 3 TIMES DAILY
Qty: 90 CAPSULE | Refills: 5 | Status: SHIPPED | OUTPATIENT
Start: 2024-07-12

## 2024-07-12 RX ORDER — HYDROCHLOROTHIAZIDE 12.5 MG/1
12.5 TABLET ORAL EVERY MORNING
COMMUNITY
Start: 2024-04-22

## 2024-07-12 NOTE — PROGRESS NOTES
AP, lateral, flexion/extension views of the lumbar spine reviewed    On the AP there is lumbar curvature with trunk shift to the left.  There are 5 non-rib-bearing lumbar vertebrae.  On the lateral there is decreased lumbar lordosis.  There is spondylotic disease with decreased disc height and osteophyte formation noted.  Grade 1 anterolisthesis at L4-5.    Impression:  Spondylotic changes of the lumbar spine as noted above

## 2024-07-12 NOTE — PROGRESS NOTES
MDM/time:  45-50 minutes spent on this encounter including 15 minutes reviewing imaging and notes, 20 minutes with the patient, 10 minutes documentation    ASSESSMENT:  49 y.o. female with lumbar degenerative scoliosis and lumbar spondylolisthesis at L4-5 with radiculopathy    PLAN:  MRI lumbar spine   PT lumbar spine   Neurontin 300mg 1 tab TID   Follow up after MRI     HPI:  49 y.o. female here for evaluation of lower back pain that radiates into bilateral buttocks and into the left thigh.  Patient reports she has had a constant pain in her back and left thigh and occasionally in the right thigh over the past year.  Unable to lay flat has to sleep in a recliner due to severity of pain.  She denies difficulty with  strength.  Balance issues but no falls.  Denies bladder bowel incontinence.  Difficulty walking or standing for any length of time.  Currently taking Robaxin and Tylenol for pain.  No recent pain management.  No recent MRI.  No prior spine surgery.  Patient is not a smoker.    IMAGING:  AP, lateral, flexion/extension views of the lumbar spine reviewed     On the AP there is lumbar curvature with trunk shift to the left.  There are 5 non-rib-bearing lumbar vertebrae.  On the lateral there is decreased lumbar lordosis.  There is spondylotic disease with decreased disc height and osteophyte formation noted.  Grade 1 anterolisthesis at L4-5.     Impression:  Spondylotic changes of the lumbar spine as noted above                 Past Medical History:   Diagnosis Date    Asthma     Diabetes mellitus     Gout, unspecified     Thyroid disease      Past Surgical History:   Procedure Laterality Date    FINGER AMPUTATION Left     PARTIAL INDEX FINGER    HYSTERECTOMY      PARTIAL    OPEN REDUCTION AND INTERNAL FIXATION (ORIF) OF INJURY OF ANKLE Right      Social History     Tobacco Use    Smoking status: Former     Current packs/day: 0.00     Types: Cigarettes     Quit date: 1993     Years since quitting:  31.5    Smokeless tobacco: Never   Substance Use Topics    Alcohol use: Never    Drug use: Never      Current Outpatient Medications   Medication Instructions    amoxicillin (AMOXIL) 875 mg, Oral, 2 times daily    cetirizine (ZYRTEC) 10 mg, Oral, Nightly    glipiZIDE (GLUCOTROL) 10 mg, Oral, With breakfast    hydroCHLOROthiazide (HYDRODIURIL) 12.5 mg, Oral, Every morning    ibuprofen (ADVIL,MOTRIN) 800 mg, Oral, Every 6 hours PRN    INDOMETHACIN ORAL Oral, TAKE 2 CAPS DAILY<BR>PT DOESN'T KNOW DOSE     metFORMIN (GLUCOPHAGE) 500 mg, Oral, 2 times daily with meals    methocarbamoL (ROBAXIN) 750 mg, Oral, Every 12 hours    potassium chloride (KLOR-CON) 10 MEQ TbSR 10 mEq, Oral, Daily        EXAM:  Constitutional  General Appearance:  Morbid obesity BMI 5.2.42, NAD  Psychiatric   Orientation: Oriented to time, oriented to place, oriented to person  Mood and Affect: Active and alert, normal mood, normal affect  Gait and Station   Appearance:  Antalgic gait to the left, unable to tandem gait, unable to walk on toes, unable to walk on heels    LUMBAR  Musculoskeletal System   Hips: Normal appearance, no leg length discrepancy, normal motion; left, normal motion; right    Lumbar Spine                   Inspection:  Normal alignment, normal sagittal balance                  Range of motion: decreased flexion, extension, lateral bending, rotation. Pain with range of motion                  Bony Palpation of the Lumbar Spine:  No tenderness of the spinous process, no tenderness of the sacrum, no tenderness of the coccyx                  Bony Palpation of the Right Hip:  No tenderness of the iliac crest, no tenderness of the sciatic notch, no tenderness of the SI joint                  Bony Palpation of the Left Hip:  No tenderness of the iliac crest, no tenderness of the sciatic notch, no tenderness of the SI joint                  Soft Tissue Palpation on the Right:  No tenderness of the paraspinal region, no tenderness of  the iliolumbar region                  Soft Tissue Palpation on the Left:  No tenderness of the paraspinal region, no tenderness of the iliolumbar region    Motor Strength   L1 Right:  Hip flexion iliopsoas 5/5    L1 Left:  Hip flexion iliopsoas 3/5              L2-L4 Right:  Knee extension quadriceps 5/5, tibialis anterior 5/5              L2-L4 Left:  Knee extension quadriceps 5/5, tibialis anterior 3/5   L5 Right:  Extensor hallucis llongus 5/5,    L5 Left:  Extensor hallucis longus 4/5,    S1 Right:  Plantar flexion gastrocnemius 5/5   S1 Left:  Plantar flexion gastrocnemius 4/5    Neurological System   Ankle Reflex Right:  normal   Ankle Reflex Left: normal   Knee Reflex Right:  normal   Knee Reflex Left:  normal   Sensation on the Right:  L2 normal, L3 normal, L4 normal, L5 normal, S1 normal   Sensation on the Left:  L2 normal, L3 normal, L4 normal, L5 normal, S1 normal              Special Test on the Right:  Seated straight leg raising test negative, no clonus of the ankle              Special Test on the Left:  Seated straight leg raising test negative, no clonus of the ankle    Skin   Lumbosacral Spine:  Normal skin    Cardiovascular System   Arterial Pulses Right:  Posterior tibialis normal, dorsalis pedis normal   Arterial Pulses Left:  Posterior tibialis normal, dorsalis pedis normal   Edema Right: None   Edema Left:  None

## 2024-07-24 ENCOUNTER — CLINICAL SUPPORT (OUTPATIENT)
Dept: REHABILITATION | Facility: HOSPITAL | Age: 49
End: 2024-07-24
Payer: COMMERCIAL

## 2024-07-24 DIAGNOSIS — M43.16 SPONDYLOLISTHESIS OF LUMBAR REGION: ICD-10-CM

## 2024-07-24 PROCEDURE — 97110 THERAPEUTIC EXERCISES: CPT | Mod: PN

## 2024-07-24 PROCEDURE — 97161 PT EVAL LOW COMPLEX 20 MIN: CPT | Mod: PN

## 2024-07-24 NOTE — PLAN OF CARE
[facility]   Physical Therapy Initial Evaluation    Name: Salina Dave  Clinic Number: [id number]    Therapy Diagnosis:   Encounter Diagnosis   Name Primary?    Spondylolisthesis of lumbar region      Physician: Savana Murray NP    Physician Orders: PT Eval and Treat    Medical Diagnosis from Referral: lumbar spondylolisthesis   Evaluation Date: 7/24/2024  Authorization Period Expiration: 9/24/2024   Plan of Care Expiration: [facility]   Visit # / Visits authorized: 1/ 13    Time In: 1400  Time Out: 1445   Total Appointment Time (timed & untimed codes): 45  minutes    Precautions: Standard    Subjective   Date of onset: pt states she has been having severe low back pain for a year . Pt states she has pain with low back extension,  lateral tilt,  pt .   States she has weakness in bilateral hips .   Pt states she  has pain with walking , up slopes , stairs,  pt voices she has pain and can't sleep at night . Pt states she can't sit still she has to constantly move .. Pt voices having to sit in a recliner to sleep .    History of current condition - Salina reports:       Medical History:   Past Medical History:   Diagnosis Date    Asthma     Diabetes mellitus     Gout, unspecified     Thyroid disease        Surgical History:   Salina Dave  has a past surgical history that includes Hysterectomy; Finger amputation (Left); and Open reduction and internal fixation (ORIF) of injury of ankle (Right).    Medications:   Salina has a current medication list which includes the following prescription(s): cetirizine, gabapentin, glipizide, hydrochlorothiazide, ibuprofen, indomethacin, metformin, methocarbamol, and potassium chloride.    Allergies:   Review of patient's allergies indicates:   Allergen Reactions    Tramadol Rash        Imaging,  awaiting  mri     Prior Therapy: none   Social History:   lives alone  Occupation: not working   Prior Level of Function: independent   Current Level of Function:  severe  pain     Pain:  Current 10/10, worst 10/10, best 5/10   Location: left and bilateral back left low back worse than right   Description: Aching, Dull, Throbbing, Grabbing, Tight, Deep, Sharp, and Electric  Aggravating Factors: Sitting, Standing, Bending, Walking, Morning, and Extension  Easing Factors: nothing    Pts goals: pt wishes to be able to be active to take care of her father .     Objective     Posture:  Standing lordosis: Increased  Sitting lordosis: Increased  Iliac crest height: left increased  PSIS height: left increased  Pelvic rotation/torsion: Yes  Scoliosis: Yes  Lateral shift: left  Comments:      MANUAL MUSCLE TEST  Right left   Hip flexion    L1,L2 MMT number: 3+/5 MMT strength: 3/5   Hip abduction   L4,5 MMT strength: 3+/5 MMT strength: 3/5   Knee extension  L3 MMT strength: 3+/5 MMT strength: 3/5   Knee flexion   S1 MMT strength: 3+/5 MMT strength: 3/5   Ankle dorsiflexion  L4,5 MMT strength: 3+/5 MMT strength: 3/5   Ankle plantar flexion  S1 MMT strength: 3+/5 MMT strength: 3/5   Extensor hallucis longus L5 MMT strength: 3+/5 MMT strength: 3/5     ROM/flexibility right left   Hip flexion (120)   110  110    Internal rotation (45) 35  20   External rotation (45) 30 25   Hamstring 90/90 (-10) -35 -35   Rectus femoris (120)                    Special test Right  Left    SLR test < 60 degrees Negative Negative   SLR test > 60 degrees Negative Negative   Sitting slump test Negative Negative   Piriformis test Negative Positive   FATIMAH test Positive Positive   SI forward bend Positive Positive   SI distraction Positive Positive   SI compression Positive Positive     Gait assessment:     Antalgic gait: Yes  Assistive device: none    Spinal mobility:  Segment:     Other test/information:    Palpation: pt has severe pain at L3-L5     Dermatome:      Limitation/Restriction for FOTO lumbar Survey    Therapist reviewed FOTO scores for Salina Dave on 7/24/2024.   FOTO documents entered into [employer] - see  Media section.    Limitation Score: 42%         TREATMENT        Salina received the treatments listed below:  THERAPEUTIC EXERCISES to develop strength, endurance, ROM, flexibility, and posture for 20  minutes including home ex program     Home Exercises and Patient Education Provided    Education provided:   - range of motion     Written Home Exercises Provided: Patient instructed to cont prior HEP.  Exercises were reviewed and Salina was able to demonstrate them prior to the end of the session.  Salina demonstrated good  understanding of the education provided.     See EMR under Patient Instructions for exercises provided 7/24/2024.    Assessment   Salina is a 49 y.o. female referred to outpatient Physical Therapy with a medical diagnosis of lumbar spondylolisthesis . Pt presents with severe low back pain  tight upper glutes, tight adductors , tight hamstrings     Pt prognosis is Excellent.   Pt will benefit from skilled outpatient Physical Therapy to address the deficits stated above and in the chart below, provide pt/family education, and to maximize pt's level of independence.     Plan of care discussed with patient: Yes  Pt's spiritual, cultural and educational needs considered and patient is agreeable to the plan of care and goals as stated below:     Anticipated Barriers for therapy:  medical diagnosis of lumbar spondylolisthesis . Pt presents with severe low back pain  tight upper glutes, tight adductors , tight hamstrings         Goals:  Short Term Goals: 4 weeks   Pt will be independent with home ex program   Pt will be able to increase bilateral hip flexion to 110   Be able to increase hip internal rotation to 30  Pt will increase all lower extremity strength to 3+/5     Long Term Goals: 6 weeks   Pt will be able to increase hip flexion to 120   Pt will be able to increase strength 4/5   Be able to tolerate 20 min of cardio     Plan   Plan of care Certification: 7/24/2024 to 9/24/2024.    Outpatient  Physical Therapy 2 times weekly for 8 weeks to include the following interventions: Electrical Stimulation ifc , Manual Therapy, Neuromuscular Re-ed, Patient Education, Therapeutic Activities, Therapeutic Exercise, and Ultrasound.     Plan of care has been reestablished with Riya BALL, Radha BALL, Savana BALL  and Rebecca BALL.      Anand Ward, PT

## 2024-07-31 ENCOUNTER — CLINICAL SUPPORT (OUTPATIENT)
Dept: REHABILITATION | Facility: HOSPITAL | Age: 49
End: 2024-07-31
Payer: COMMERCIAL

## 2024-07-31 DIAGNOSIS — M43.16 SPONDYLOLISTHESIS OF LUMBAR REGION: Primary | ICD-10-CM

## 2024-07-31 DIAGNOSIS — M25.69 DECREASED RANGE OF MOTION OF TRUNK AND BACK: ICD-10-CM

## 2024-07-31 PROCEDURE — 97530 THERAPEUTIC ACTIVITIES: CPT | Mod: PN,CQ

## 2024-07-31 PROCEDURE — 97014 ELECTRIC STIMULATION THERAPY: CPT | Mod: PN,CQ

## 2024-07-31 NOTE — PROGRESS NOTES
Physical Therapy Treatment Note     Name: Salina Dave  Clinic Number: [id number]    Therapy Diagnosis: No diagnosis found.  Physician: Savana Murray NP    Visit Date: 7/31/2024    Physician Orders: PT Eval and Treat    Medical Diagnosis from Referral: lumbar spondylolisthesis   Evaluation Date: 7/24/2024  Authorization Period Expiration: 9/24/2024   Plan of Care Expiration: [facility]   Visit # / Visits authorized: 2/ 13  PTA Visit #: 1    Time In: 1015  Time Out: 1053  Total Billable Time: 38 minutes    Precautions: Standard      Subjective     Pt reports: I'm really hurting this am, my left leg is numb. I don't have anything to take for pain other than tylenol.  She was compliant with home exercise program.  Response to previous treatment: soreness  Functional change: ongoing    Pain: 10/10  Location: bilateral back      Objective   Range of motion - not taken due to severe pain    Salina participated in neuromuscular re-education activities to improve:  for  minutes. The following activities were included:      Salina participated in dynamic functional therapeutic activities to improve functional performance for 8 minutes, including:  Sitting gray ball  trunk flexion rollouts to promote donning/doffing shoes x 10  Sitting bilateral trunk flexion stretch gray ball x 10 to simulate reaching from sitting        Salina received the following direct contact modalities after being cleared for contraindications:     Salina received the following supervised modalities after being cleared for contradictions: IFC Electrical Stimulation:  Salina received IFC Electrical Stimulation for pain control applied to the mid-low back. Pt received stimulation at 100 % scan at a frequency of 13 for 30 minutes. Salina tolderated treatment well without any adverse effects.      Salina received hot pack for 20 minutes to low back.      Home Exercises Provided and Patient Education Provided     Education provided: home exercise  program     Written Home Exercises Provided: Patient instructed to cont prior HEP.  Exercises were reviewed and Salina was able to demonstrate them prior to the end of the session.  Salina demonstrated good  understanding of the education provided.     See EMR under Patient Instructions for exercises provided prior visit.    Assessment     Patient crying while perform therapeutic activities,   Salina Is progressing well towards her goals.   Pt prognosis is Excellent.     Pt will continue to benefit from skilled outpatient physical therapy to address the deficits listed in the problem list box on initial evaluation, provide pt/family education and to maximize pt's level of independence in the home and community environment.     Pt's spiritual, cultural and educational needs considered and pt agreeable to plan of care and goals.     Anticipated barriers to physical therapy: compliance with home exercise program     Goals:  Short Term Goals: 4 weeks   Pt will be independent with home ex program   Pt will be able to increase bilateral hip flexion to 110   Be able to increase hip internal rotation to 30  Pt will increase all lower extremity strength to 3+/5                Long Term Goals: 6 weeks   Pt will be able to increase hip flexion to 120   Pt will be able to increase strength 4/5   Be able to tolerate 20 min of cardio     Plan   Plan of care Certification: 7/24/2024 to 9/24/2024.     Outpatient Physical Therapy 2 times weekly for 8 weeks to include the following interventions: Electrical Stimulation ifc , Manual Therapy, Neuromuscular Re-ed, Patient Education, Therapeutic Activities, Therapeutic Exercise, and Ultrasound. Plan of care reviewed with Anand Ward, PT.    Maricel Colmenares, PTA  7/31/2024

## 2024-08-06 ENCOUNTER — CLINICAL SUPPORT (OUTPATIENT)
Dept: REHABILITATION | Facility: HOSPITAL | Age: 49
End: 2024-08-06
Payer: COMMERCIAL

## 2024-08-06 DIAGNOSIS — M25.69 DECREASED RANGE OF MOTION OF TRUNK AND BACK: Primary | ICD-10-CM

## 2024-08-06 DIAGNOSIS — M43.16 SPONDYLOLISTHESIS OF LUMBAR REGION: ICD-10-CM

## 2024-08-06 PROCEDURE — 97530 THERAPEUTIC ACTIVITIES: CPT | Mod: PN,CQ

## 2024-08-06 PROCEDURE — 97112 NEUROMUSCULAR REEDUCATION: CPT | Mod: PN,CQ

## 2024-08-06 PROCEDURE — 97014 ELECTRIC STIMULATION THERAPY: CPT | Mod: PN,CQ

## 2024-08-08 ENCOUNTER — CLINICAL SUPPORT (OUTPATIENT)
Dept: REHABILITATION | Facility: HOSPITAL | Age: 49
End: 2024-08-08
Payer: COMMERCIAL

## 2024-08-08 DIAGNOSIS — M25.69 DECREASED RANGE OF MOTION OF TRUNK AND BACK: Primary | ICD-10-CM

## 2024-08-08 PROCEDURE — 97530 THERAPEUTIC ACTIVITIES: CPT | Mod: PN,CQ

## 2024-08-08 PROCEDURE — 97112 NEUROMUSCULAR REEDUCATION: CPT | Mod: PN,CQ

## 2024-08-16 ENCOUNTER — OFFICE VISIT (OUTPATIENT)
Dept: SPINE | Facility: CLINIC | Age: 49
End: 2024-08-16
Payer: COMMERCIAL

## 2024-08-16 DIAGNOSIS — E66.01 MORBID OBESITY DUE TO EXCESS CALORIES: ICD-10-CM

## 2024-08-16 DIAGNOSIS — M54.16 LUMBAR RADICULOPATHY, CHRONIC: ICD-10-CM

## 2024-08-16 DIAGNOSIS — M43.16 SPONDYLOLISTHESIS OF LUMBAR REGION: Primary | ICD-10-CM

## 2024-08-16 PROCEDURE — 1159F MED LIST DOCD IN RCRD: CPT | Mod: CPTII,,, | Performed by: ORTHOPAEDIC SURGERY

## 2024-08-16 PROCEDURE — 99999 PR PBB SHADOW E&M-EST. PATIENT-LVL III: CPT | Mod: PBBFAC,,, | Performed by: ORTHOPAEDIC SURGERY

## 2024-08-16 PROCEDURE — 99214 OFFICE O/P EST MOD 30 MIN: CPT | Mod: S$PBB,,, | Performed by: ORTHOPAEDIC SURGERY

## 2024-08-16 PROCEDURE — 99213 OFFICE O/P EST LOW 20 MIN: CPT | Mod: PBBFAC | Performed by: ORTHOPAEDIC SURGERY

## 2024-08-16 RX ORDER — AMITRIPTYLINE HYDROCHLORIDE 25 MG/1
25 TABLET, FILM COATED ORAL NIGHTLY
Qty: 30 TABLET | Refills: 11 | Status: SHIPPED | OUTPATIENT
Start: 2024-08-16 | End: 2025-08-16

## 2024-08-16 NOTE — PROGRESS NOTES
MDM/time:  30-35 minutes spent on this encounter including 10 minutes reviewing imaging and notes, 15 minutes with the patient, 5 minutes documentation    ASSESSMENT:  49 y.o. female with lumbar degenerative scoliosis and lumbar spondylolisthesis at L4-5 with radiculopathy    PLAN:  Elavil.  Pain referral.  Follow-up in 3 months.  Continue to work on weight loss.      HPI:  49 y.o. female here for repeat evaluation of lower back pain that radiates into bilateral buttocks and into the left thigh.  Patient reports she has had a constant pain in her back and left thigh and occasionally in the right thigh over the past year.  Unable to lay flat has to sleep in a recliner due to severity of pain.  She denies difficulty with  strength.  Balance issues but no falls.  Denies bladder bowel incontinence.  Difficulty walking or standing for any length of time.  Currently taking Robaxin and Tylenol for pain.  No recent pain management.  No prior spine surgery.  Patient is not a smoker.    IMAGING:  X-rays lumbar spine reviewed show:   On the AP there is lumbar curvature with trunk shift to the left.  There are 5 non-rib-bearing lumbar vertebrae.  On the lateral there is decreased lumbar lordosis.  There is spondylotic disease with decreased disc height and osteophyte formation noted.  Grade 1 anterolisthesis at L4-5.    MRI lumbar spine 08/16/2024 reviewed shows:   At L3-4 there is moderate right foraminal stenosis   At L4-5 there is grade 1 spondylolisthesis.  Severe right and moderate left foraminal stenosis   At L5-S1 there is a left lateral disc herniation.  Moderate right and severe left foraminal stenosis        Past Medical History:   Diagnosis Date    Asthma     Diabetes mellitus     Gout, unspecified     Thyroid disease      Past Surgical History:   Procedure Laterality Date    FINGER AMPUTATION Left     PARTIAL INDEX FINGER    HYSTERECTOMY      PARTIAL    OPEN REDUCTION AND INTERNAL FIXATION (ORIF) OF INJURY  OF ANKLE Right      Social History     Tobacco Use    Smoking status: Former     Current packs/day: 0.00     Types: Cigarettes     Quit date:      Years since quittin.6    Smokeless tobacco: Never   Substance Use Topics    Alcohol use: Never    Drug use: Never      Current Outpatient Medications   Medication Instructions    cetirizine (ZYRTEC) 10 mg, Oral, Nightly    gabapentin (NEURONTIN) 300 mg, Oral, 3 times daily    glipiZIDE (GLUCOTROL) 10 mg, Oral, With breakfast    hydroCHLOROthiazide (HYDRODIURIL) 12.5 mg, Oral, Every morning    ibuprofen (ADVIL,MOTRIN) 800 mg, Oral, Every 6 hours PRN    INDOMETHACIN ORAL Oral, TAKE 2 CAPS DAILY<BR>PT DOESN'T KNOW DOSE     metFORMIN (GLUCOPHAGE) 500 mg, Oral, 2 times daily with meals    methocarbamoL (ROBAXIN) 750 mg, Every 12 hours    potassium chloride (KLOR-CON) 10 MEQ TbSR 10 mEq, Oral, Daily        EXAM:  Constitutional  General Appearance:  Morbid obesity BMI 5.2.42, NAD  Psychiatric   Orientation: Oriented to time, oriented to place, oriented to person  Mood and Affect: Active and alert, normal mood, normal affect  Gait and Station   Appearance:  Antalgic gait to the left, unable to tandem gait, unable to walk on toes, unable to walk on heels    LUMBAR  Musculoskeletal System   Hips: Normal appearance, no leg length discrepancy, normal motion; left, normal motion; right    Lumbar Spine                   Inspection:  Normal alignment, normal sagittal balance                  Range of motion: decreased flexion, extension, lateral bending, rotation. Pain with range of motion                  Bony Palpation of the Lumbar Spine:  No tenderness of the spinous process, no tenderness of the sacrum, no tenderness of the coccyx                  Bony Palpation of the Right Hip:  No tenderness of the iliac crest, no tenderness of the sciatic notch, no tenderness of the SI joint                  Bony Palpation of the Left Hip:  No tenderness of the iliac crest, no  tenderness of the sciatic notch, no tenderness of the SI joint                  Soft Tissue Palpation on the Right:  No tenderness of the paraspinal region, no tenderness of the iliolumbar region                  Soft Tissue Palpation on the Left:  No tenderness of the paraspinal region, no tenderness of the iliolumbar region    Motor Strength   L1 Right:  Hip flexion iliopsoas 5/5    L1 Left:  Hip flexion iliopsoas 3/5              L2-L4 Right:  Knee extension quadriceps 5/5, tibialis anterior 5/5              L2-L4 Left:  Knee extension quadriceps 5/5, tibialis anterior 3/5   L5 Right:  Extensor hallucis llongus 5/5,    L5 Left:  Extensor hallucis longus 4/5,    S1 Right:  Plantar flexion gastrocnemius 5/5   S1 Left:  Plantar flexion gastrocnemius 4/5    Neurological System   Ankle Reflex Right:  normal   Ankle Reflex Left: normal   Knee Reflex Right:  normal   Knee Reflex Left:  normal   Sensation on the Right:  L2 normal, L3 normal, L4 normal, L5 normal, S1 normal   Sensation on the Left:  L2 normal, L3 normal, L4 normal, L5 normal, S1 normal              Special Test on the Right:  Seated straight leg raising test negative, no clonus of the ankle              Special Test on the Left:  Seated straight leg raising test negative, no clonus of the ankle    Skin   Lumbosacral Spine:  Normal skin    Cardiovascular System   Arterial Pulses Right:  Posterior tibialis normal, dorsalis pedis normal   Arterial Pulses Left:  Posterior tibialis normal, dorsalis pedis normal   Edema Right: None   Edema Left:  None

## 2024-08-26 PROBLEM — M25.69 DECREASED RANGE OF MOTION OF TRUNK AND BACK: Status: RESOLVED | Noted: 2024-07-31 | Resolved: 2024-08-26

## 2024-11-04 NOTE — H&P (VIEW-ONLY)
Subjective:         Patient ID: Salina Dave is a 49 y.o. female.    Chief Complaint: Back Pain and Knee Pain (Left )      Pain  This is a chronic problem. The current episode started more than 1 year ago. The problem occurs daily. Associated symptoms include arthralgias. Pertinent negatives include no anorexia, change in bowel habit, chest pain, chills, coughing, diaphoresis, fever, neck pain, rash, sore throat, swollen glands, urinary symptoms, vertigo or vomiting.     Review of Systems   Constitutional:  Negative for activity change, appetite change, chills, diaphoresis, fever and unexpected weight change.   HENT:  Negative for drooling, ear discharge, ear pain, facial swelling, nosebleeds, sore throat, trouble swallowing, voice change and goiter.    Eyes:  Negative for photophobia, pain, discharge, redness and visual disturbance.   Respiratory:  Negative for apnea, cough, choking, chest tightness, shortness of breath, wheezing and stridor.    Cardiovascular:  Negative for chest pain, palpitations and leg swelling.   Gastrointestinal:  Negative for abdominal distention, anorexia, change in bowel habit, diarrhea, rectal pain, vomiting and fecal incontinence.   Endocrine: Negative for cold intolerance, heat intolerance, polydipsia, polyphagia and polyuria.   Genitourinary:  Negative for bladder incontinence, dysuria, flank pain, frequency and hot flashes.   Musculoskeletal:  Positive for arthralgias, back pain and leg pain. Negative for neck pain.   Integumentary:  Negative for color change, pallor and rash.   Allergic/Immunologic: Negative for immunocompromised state.   Neurological:  Negative for dizziness, vertigo, seizures, syncope, facial asymmetry, speech difficulty, light-headedness, memory loss and coordination difficulties.   Hematological:  Negative for adenopathy. Does not bruise/bleed easily.   Psychiatric/Behavioral:  Negative for agitation, behavioral problems, confusion, decreased concentration,  "dysphoric mood, hallucinations, self-injury and suicidal ideas. The patient is not nervous/anxious and is not hyperactive.            Past Medical History:   Diagnosis Date    Asthma     Diabetes mellitus     Gout, unspecified     Thyroid disease      Past Surgical History:   Procedure Laterality Date    FINGER AMPUTATION Left     PARTIAL INDEX FINGER    HYSTERECTOMY      PARTIAL    OPEN REDUCTION AND INTERNAL FIXATION (ORIF) OF INJURY OF ANKLE Right      Social History     Socioeconomic History    Marital status: Single   Tobacco Use    Smoking status: Former     Current packs/day: 0.00     Types: Cigarettes     Quit date:      Years since quittin.8    Smokeless tobacco: Never   Substance and Sexual Activity    Alcohol use: Never    Drug use: Never    Sexual activity: Not Currently     Family History   Family history unknown: Yes     Review of patient's allergies indicates:   Allergen Reactions    Tramadol Rash        Objective:  Vitals:    24 1256   BP: (!) 147/81   Pulse: 94   Resp: 16   Weight: (!) 161 kg (354 lb 15.1 oz)   Height: 5' 9" (1.753 m)   PainSc: 10-Worst pain ever         Physical Exam  Vitals and nursing note reviewed. Exam conducted with a chaperone present.   Constitutional:       General: She is awake. She is not in acute distress.     Appearance: Normal appearance. She is not ill-appearing, toxic-appearing or diaphoretic.   HENT:      Head: Normocephalic and atraumatic.      Nose: Nose normal.      Mouth/Throat:      Mouth: Mucous membranes are moist.      Pharynx: Oropharynx is clear.   Eyes:      Conjunctiva/sclera: Conjunctivae normal.      Pupils: Pupils are equal, round, and reactive to light.   Cardiovascular:      Rate and Rhythm: Normal rate.   Pulmonary:      Effort: Pulmonary effort is normal. No respiratory distress.   Abdominal:      Palpations: Abdomen is soft.      Tenderness: There is no guarding.   Musculoskeletal:         General: Normal range of motion.      " Cervical back: Normal range of motion and neck supple. No rigidity.   Skin:     General: Skin is warm and dry.      Coloration: Skin is not jaundiced or pale.   Neurological:      General: No focal deficit present.      Mental Status: She is alert and oriented to person, place, and time. Mental status is at baseline.      Cranial Nerves: No cranial nerve deficit (II-XII).   Psychiatric:         Mood and Affect: Mood normal.         Behavior: Behavior normal. Behavior is cooperative.         Thought Content: Thought content normal.           MRI Lumbar Spine Without Contrast  Narrative: EXAMINATION:  MRI LUMBAR SPINE WITHOUT CONTRAST    CLINICAL HISTORY:  Lumbar radiculopathy, symptoms persist with conservative treatment; Radiculopathy, lumbar region    TECHNIQUE:  Multiplanar, multisequence MRI of the lumbar spine performed without the administration of contrast.    COMPARISON:  07/12/2024 radiograph    FINDINGS:  Vertebral body heights and alignment are maintained.  Degenerative endplate changes including type 2 change seen of L3-4 through L5-S1.  Diffuse disc degeneration.  Conus terminates L1.    T12-L1: Disc bulging with a central disc extrusion that extends inferiorly seen on the sagittal images.  Mild spinal canal stenosis.  No foraminal stenosis.    L1-L2: Facet degeneration.  No spinal canal stenosis.  Mild foraminal bilaterally.    L2-3: Shallow disc bulge and facet degeneration.  No spinal canal stenosis.  Mild right foraminal stenosis.  No left foraminal stenosis.    L3-4: Disc bulging.  Facet degeneration.  Mild spinal canal stenosis.  Mild right greater than left foraminal stenosis.    L4-5: Disc bulging and prominent facet degeneration.  Mild spinal canal stenosis.  The bulky facet osteophytes and ligamentum flavum thickening indent upon the posterior aspect of the right more so than left lateral recess/thecal sac region.  Mild-to-moderate bilateral foraminal stenosis.    L5-S1: Disc bulging.  There is  a left foraminal protrusion.  Facet degeneration.  Mild spinal canal stenosis.  Moderate bilateral foraminal stenosis.    There is posterior paraspinal muscular atrophy noted.  Impression: Multilevel diffuse degenerative changes throughout the lumbar spine.    Electronically signed by: Anand Whiting  Date:    08/16/2024  Time:    10:27       No visits with results within 6 Month(s) from this visit.   Latest known visit with results is:   Office Visit on 05/10/2024   Component Date Value Ref Range Status    POC Molecular Influenza A Ag 05/10/2024 Negative  Negative Final    POC Molecular Influenza B Ag 05/10/2024 Negative  Negative Final     Acceptable 05/10/2024 Yes   Final    POC Rapid COVID 05/10/2024 Negative  Negative Final     Acceptable 05/10/2024 Yes   Final    Molecular Strep A, POC 05/10/2024 Negative  Negative Final     Acceptable 05/10/2024 Yes   Final         Orders Placed This Encounter   Procedures    Controlled Substance Monitoring Panel, Random, Urine     Standing Status:   Future     Number of Occurrences:   1     Standing Expiration Date:   1/11/2026     Order Specific Question:   Send normal result to authorizing provider's In Basket if patient is active on MyChart:     Answer:   Yes    POCT Urine Drug Screen Presump     Interpretive Information:     Negative:  No drug detected at the cut off level.   Positive:  This result represents presumptive positive for the   tested drug, other substances may yield a positive response other   than the analyte of interest. This result should be utilized for   diagnostic purpose only. Confirmation testing will be performed upon physician request only.       Case Request Operating Room: Injection, Steroid, Epidural, L4/5     Order Specific Question:   Medical Necessity:     Answer:   Medically Non-Urgent [100]     Order Specific Question:   Case classification     Answer:   E - Elective [90]     Order Specific Question:    Is an on-site pathologist required for this procedure?     Answer:   N/A       Requested Prescriptions     Signed Prescriptions Disp Refills    celecoxib (CELEBREX) 200 MG capsule 30 capsule 0     Sig: Take 1 capsule (200 mg total) by mouth once daily.       Assessment:     1. Lumbar radiculopathy, chronic    2. Spondylolisthesis of lumbar region    3. Primary osteoarthritis involving multiple joints    4. Encounter for long-term (current) use of medications         A's of Opioid Risk Assessment  Activity:  Current medication helps perform ADL.   Analgesia:Patients pain is partially controlled by current medication. Patient has tried OTC medications such as Tylenol and Ibuprofen with out relief.   Adverse Effects: Patient denies constipation or sedation.  Aberrant Behavior:  reviewed with no aberrant drug seeking/taking behavior.  Overdose reversal drug naloxone discussed    Drug screen reviewed      Plan:    New patient    Dr. Colmenares, spine surgery [facility]  Office note August 16, 2024 reviewed  Lumbar scoliosis  Lumbar spondylolisthesis L4/5 with radiculopathy  Elavil  Referral Pain Management  Follow-up 3 months  Continue weight loss        X-rays lumbar spine [facility] July 12th, 2024 reviewed show:   On the AP there is lumbar curvature with trunk shift to the left.  There are 5 non-rib-bearing lumbar vertebrae.  On the lateral there is decreased lumbar lordosis.  There is spondylotic disease with decreased disc height and osteophyte formation noted.  Grade 1 anterolisthesis at L4-5.     MRI lumbar spine [facility] 08/16/2024 reviewed shows:   Herniated disc free fragment T12-L1 not appreciated on this MRI    At L3-4 there is moderate right foraminal stenosis   At L4-5 there is grade 1 spondylolisthesis.  Severe right and moderate left foraminal stenosis   At L5-S1 there is a left lateral disc herniation.  Moderate right and severe left foraminal stenosis        November 12, 2024     Patient  presents clinic today greater three-month history back pain buttock and leg pain numbness and tingling worse with standing erect worse with walking long distances can have some sharp stabbing components further she walks   She states the pain is made better with short periods resting    She denies loss of bowel or bladder function     She states she completed physical therapy [facility] we will obtain records it did not help control her discomfort    She discontinued ibuprofen     Requesting procedure for lumbar radiculopathy symptoms     Requesting Toradol injection     Toradol 30 mg IM, tolerated well    Trial Celebrex 200 mg 1 p.o. q.day    Continue home exercise program as directed    Indications for this procedure for this specific patient include the following     - Injections being provided as part of a comprehensive pain management program.    - Pt has failed 6 weeks of conservative therapy including oral meds, PT and or home exercise program which has been discussed with the patient   - Injection being provided for suspected radicular pain.    - Pain scale of greater than or equal to 3/10 with functional impairment  - No evidence of local or systemic infection, bleeding tendency or unstable medical condition.    - Pain is causing significant functional limitation resulting in diminished quality of life and impaired age appropriate ADL's.   - Repeat injections are done no sooner than 7 days after the previous injection  - Epidural done for suspected radicular pain along dermatome of nerve   - Epidural done to differentiate level of radicular nerve root pain   -procedure done prior to surgical consideration  - Repeat injections done only when pt reports 50% improvement in pain from previous injections    -increased level of function  - patient is aware if they take any NSAIDs/anticoagulant prior to procedure procedure will be postponed, this was listed on the preop instructions and highlighted, list  of NSAIDs/anticoagulant reviewed with patient to include methotrexate  - Injection done at L4/5 level(s) which is consistent with patient's dermatomal pain complaint  The planned medically necessary  surgical procedure is performed in a hospital outpatient department and not in an ambulatory surgical center due to:     -there is no geographically assessable ambulatory surgery center that has the  necessary equipment and fluoroscopy needed for the procedure     -there is no geographically assessable ambulatory surgical center available at which the physician has privileges     -an ASC's  specific  guideline regarding the individuals weight or health conditions that prevent the use of an ASC     -done under fluoro  Monitor anesthesia request is medically indicated for the scheduled nerve block procedure due to:  1- needle phobia and anxiety, placing  the patient at risk during the provided service.  2-patient has an ASA class greater than 3 and requires constant presence of an anesthesiologist during the procedure,   3-patient has severe problems hard to lie still  4-patient suffers from chronic pain and is unable to function due to  diminished ADLs    Schedule lumbar L4/5 ROSELYN # 1, lumbar radicular    Dr. Scott    Bring original prescription medication bottles/container/box with labels to each visit    Greater than 30 minutes spent on this encounter including 10 minutes reviewing imaging and notes, 15 minutes with the patient, 5 minutes documentation

## 2024-11-04 NOTE — PROGRESS NOTES
Subjective:         Patient ID: Slaina Dave is a 49 y.o. female.    Chief Complaint: Back Pain and Knee Pain (Left )      Pain  This is a chronic problem. The current episode started more than 1 year ago. The problem occurs daily. Associated symptoms include arthralgias. Pertinent negatives include no anorexia, change in bowel habit, chest pain, chills, coughing, diaphoresis, fever, neck pain, rash, sore throat, swollen glands, urinary symptoms, vertigo or vomiting.     Review of Systems   Constitutional:  Negative for activity change, appetite change, chills, diaphoresis, fever and unexpected weight change.   HENT:  Negative for drooling, ear discharge, ear pain, facial swelling, nosebleeds, sore throat, trouble swallowing, voice change and goiter.    Eyes:  Negative for photophobia, pain, discharge, redness and visual disturbance.   Respiratory:  Negative for apnea, cough, choking, chest tightness, shortness of breath, wheezing and stridor.    Cardiovascular:  Negative for chest pain, palpitations and leg swelling.   Gastrointestinal:  Negative for abdominal distention, anorexia, change in bowel habit, diarrhea, rectal pain, vomiting and fecal incontinence.   Endocrine: Negative for cold intolerance, heat intolerance, polydipsia, polyphagia and polyuria.   Genitourinary:  Negative for bladder incontinence, dysuria, flank pain, frequency and hot flashes.   Musculoskeletal:  Positive for arthralgias, back pain and leg pain. Negative for neck pain.   Integumentary:  Negative for color change, pallor and rash.   Allergic/Immunologic: Negative for immunocompromised state.   Neurological:  Negative for dizziness, vertigo, seizures, syncope, facial asymmetry, speech difficulty, light-headedness, memory loss and coordination difficulties.   Hematological:  Negative for adenopathy. Does not bruise/bleed easily.   Psychiatric/Behavioral:  Negative for agitation, behavioral problems, confusion, decreased concentration,  "dysphoric mood, hallucinations, self-injury and suicidal ideas. The patient is not nervous/anxious and is not hyperactive.            Past Medical History:   Diagnosis Date    Asthma     Diabetes mellitus     Gout, unspecified     Thyroid disease      Past Surgical History:   Procedure Laterality Date    FINGER AMPUTATION Left     PARTIAL INDEX FINGER    HYSTERECTOMY      PARTIAL    OPEN REDUCTION AND INTERNAL FIXATION (ORIF) OF INJURY OF ANKLE Right      Social History     Socioeconomic History    Marital status: Single   Tobacco Use    Smoking status: Former     Current packs/day: 0.00     Types: Cigarettes     Quit date:      Years since quittin.8    Smokeless tobacco: Never   Substance and Sexual Activity    Alcohol use: Never    Drug use: Never    Sexual activity: Not Currently     Family History   Family history unknown: Yes     Review of patient's allergies indicates:   Allergen Reactions    Tramadol Rash        Objective:  Vitals:    24 1256   BP: (!) 147/81   Pulse: 94   Resp: 16   Weight: (!) 161 kg (354 lb 15.1 oz)   Height: 5' 9" (1.753 m)   PainSc: 10-Worst pain ever         Physical Exam  Vitals and nursing note reviewed. Exam conducted with a chaperone present.   Constitutional:       General: She is awake. She is not in acute distress.     Appearance: Normal appearance. She is not ill-appearing, toxic-appearing or diaphoretic.   HENT:      Head: Normocephalic and atraumatic.      Nose: Nose normal.      Mouth/Throat:      Mouth: Mucous membranes are moist.      Pharynx: Oropharynx is clear.   Eyes:      Conjunctiva/sclera: Conjunctivae normal.      Pupils: Pupils are equal, round, and reactive to light.   Cardiovascular:      Rate and Rhythm: Normal rate.   Pulmonary:      Effort: Pulmonary effort is normal. No respiratory distress.   Abdominal:      Palpations: Abdomen is soft.      Tenderness: There is no guarding.   Musculoskeletal:         General: Normal range of motion.      " Cervical back: Normal range of motion and neck supple. No rigidity.   Skin:     General: Skin is warm and dry.      Coloration: Skin is not jaundiced or pale.   Neurological:      General: No focal deficit present.      Mental Status: She is alert and oriented to person, place, and time. Mental status is at baseline.      Cranial Nerves: No cranial nerve deficit (II-XII).   Psychiatric:         Mood and Affect: Mood normal.         Behavior: Behavior normal. Behavior is cooperative.         Thought Content: Thought content normal.           MRI Lumbar Spine Without Contrast  Narrative: EXAMINATION:  MRI LUMBAR SPINE WITHOUT CONTRAST    CLINICAL HISTORY:  Lumbar radiculopathy, symptoms persist with conservative treatment; Radiculopathy, lumbar region    TECHNIQUE:  Multiplanar, multisequence MRI of the lumbar spine performed without the administration of contrast.    COMPARISON:  07/12/2024 radiograph    FINDINGS:  Vertebral body heights and alignment are maintained.  Degenerative endplate changes including type 2 change seen of L3-4 through L5-S1.  Diffuse disc degeneration.  Conus terminates L1.    T12-L1: Disc bulging with a central disc extrusion that extends inferiorly seen on the sagittal images.  Mild spinal canal stenosis.  No foraminal stenosis.    L1-L2: Facet degeneration.  No spinal canal stenosis.  Mild foraminal bilaterally.    L2-3: Shallow disc bulge and facet degeneration.  No spinal canal stenosis.  Mild right foraminal stenosis.  No left foraminal stenosis.    L3-4: Disc bulging.  Facet degeneration.  Mild spinal canal stenosis.  Mild right greater than left foraminal stenosis.    L4-5: Disc bulging and prominent facet degeneration.  Mild spinal canal stenosis.  The bulky facet osteophytes and ligamentum flavum thickening indent upon the posterior aspect of the right more so than left lateral recess/thecal sac region.  Mild-to-moderate bilateral foraminal stenosis.    L5-S1: Disc bulging.  There is  a left foraminal protrusion.  Facet degeneration.  Mild spinal canal stenosis.  Moderate bilateral foraminal stenosis.    There is posterior paraspinal muscular atrophy noted.  Impression: Multilevel diffuse degenerative changes throughout the lumbar spine.    Electronically signed by: Anand Whiting  Date:    08/16/2024  Time:    10:27       No visits with results within 6 Month(s) from this visit.   Latest known visit with results is:   Office Visit on 05/10/2024   Component Date Value Ref Range Status    POC Molecular Influenza A Ag 05/10/2024 Negative  Negative Final    POC Molecular Influenza B Ag 05/10/2024 Negative  Negative Final     Acceptable 05/10/2024 Yes   Final    POC Rapid COVID 05/10/2024 Negative  Negative Final     Acceptable 05/10/2024 Yes   Final    Molecular Strep A, POC 05/10/2024 Negative  Negative Final     Acceptable 05/10/2024 Yes   Final         Orders Placed This Encounter   Procedures    Controlled Substance Monitoring Panel, Random, Urine     Standing Status:   Future     Number of Occurrences:   1     Standing Expiration Date:   1/11/2026     Order Specific Question:   Send normal result to authorizing provider's In Basket if patient is active on MyChart:     Answer:   Yes    POCT Urine Drug Screen Presump     Interpretive Information:     Negative:  No drug detected at the cut off level.   Positive:  This result represents presumptive positive for the   tested drug, other substances may yield a positive response other   than the analyte of interest. This result should be utilized for   diagnostic purpose only. Confirmation testing will be performed upon physician request only.       Case Request Operating Room: Injection, Steroid, Epidural, L4/5     Order Specific Question:   Medical Necessity:     Answer:   Medically Non-Urgent [100]     Order Specific Question:   Case classification     Answer:   E - Elective [90]     Order Specific Question:    Is an on-site pathologist required for this procedure?     Answer:   N/A       Requested Prescriptions     Signed Prescriptions Disp Refills    celecoxib (CELEBREX) 200 MG capsule 30 capsule 0     Sig: Take 1 capsule (200 mg total) by mouth once daily.       Assessment:     1. Lumbar radiculopathy, chronic    2. Spondylolisthesis of lumbar region    3. Primary osteoarthritis involving multiple joints    4. Encounter for long-term (current) use of medications         A's of Opioid Risk Assessment  Activity:  Current medication helps perform ADL.   Analgesia:Patients pain is partially controlled by current medication. Patient has tried OTC medications such as Tylenol and Ibuprofen with out relief.   Adverse Effects: Patient denies constipation or sedation.  Aberrant Behavior:  reviewed with no aberrant drug seeking/taking behavior.  Overdose reversal drug naloxone discussed    Drug screen reviewed      Plan:    New patient    Dr. Colmenares, spine surgery [facility]  Office note August 16, 2024 reviewed  Lumbar scoliosis  Lumbar spondylolisthesis L4/5 with radiculopathy  Elavil  Referral Pain Management  Follow-up 3 months  Continue weight loss        X-rays lumbar spine [facility] July 12th, 2024 reviewed show:   On the AP there is lumbar curvature with trunk shift to the left.  There are 5 non-rib-bearing lumbar vertebrae.  On the lateral there is decreased lumbar lordosis.  There is spondylotic disease with decreased disc height and osteophyte formation noted.  Grade 1 anterolisthesis at L4-5.     MRI lumbar spine [facility] 08/16/2024 reviewed shows:   Herniated disc free fragment T12-L1 not appreciated on this MRI    At L3-4 there is moderate right foraminal stenosis   At L4-5 there is grade 1 spondylolisthesis.  Severe right and moderate left foraminal stenosis   At L5-S1 there is a left lateral disc herniation.  Moderate right and severe left foraminal stenosis        November 12, 2024     Patient  presents clinic today greater three-month history back pain buttock and leg pain numbness and tingling worse with standing erect worse with walking long distances can have some sharp stabbing components further she walks   She states the pain is made better with short periods resting    She denies loss of bowel or bladder function     She states she completed physical therapy [facility] we will obtain records it did not help control her discomfort    She discontinued ibuprofen     Requesting procedure for lumbar radiculopathy symptoms     Requesting Toradol injection     Toradol 30 mg IM, tolerated well    Trial Celebrex 200 mg 1 p.o. q.day    Continue home exercise program as directed    Indications for this procedure for this specific patient include the following     - Injections being provided as part of a comprehensive pain management program.    - Pt has failed 6 weeks of conservative therapy including oral meds, PT and or home exercise program which has been discussed with the patient   - Injection being provided for suspected radicular pain.    - Pain scale of greater than or equal to 3/10 with functional impairment  - No evidence of local or systemic infection, bleeding tendency or unstable medical condition.    - Pain is causing significant functional limitation resulting in diminished quality of life and impaired age appropriate ADL's.   - Repeat injections are done no sooner than 7 days after the previous injection  - Epidural done for suspected radicular pain along dermatome of nerve   - Epidural done to differentiate level of radicular nerve root pain   -procedure done prior to surgical consideration  - Repeat injections done only when pt reports 50% improvement in pain from previous injections    -increased level of function  - patient is aware if they take any NSAIDs/anticoagulant prior to procedure procedure will be postponed, this was listed on the preop instructions and highlighted, list  of NSAIDs/anticoagulant reviewed with patient to include methotrexate  - Injection done at L4/5 level(s) which is consistent with patient's dermatomal pain complaint  The planned medically necessary  surgical procedure is performed in a hospital outpatient department and not in an ambulatory surgical center due to:     -there is no geographically assessable ambulatory surgery center that has the  necessary equipment and fluoroscopy needed for the procedure     -there is no geographically assessable ambulatory surgical center available at which the physician has privileges     -an ASC's  specific  guideline regarding the individuals weight or health conditions that prevent the use of an ASC     -done under fluoro  Monitor anesthesia request is medically indicated for the scheduled nerve block procedure due to:  1- needle phobia and anxiety, placing  the patient at risk during the provided service.  2-patient has an ASA class greater than 3 and requires constant presence of an anesthesiologist during the procedure,   3-patient has severe problems hard to lie still  4-patient suffers from chronic pain and is unable to function due to  diminished ADLs    Schedule lumbar L4/5 ROSELYN # 1, lumbar radicular    Dr. Scott    Bring original prescription medication bottles/container/box with labels to each visit    Greater than 30 minutes spent on this encounter including 10 minutes reviewing imaging and notes, 15 minutes with the patient, 5 minutes documentation

## 2024-11-12 ENCOUNTER — OFFICE VISIT (OUTPATIENT)
Dept: PAIN MEDICINE | Facility: CLINIC | Age: 49
End: 2024-11-12
Payer: COMMERCIAL

## 2024-11-12 VITALS
HEIGHT: 69 IN | HEART RATE: 94 BPM | SYSTOLIC BLOOD PRESSURE: 147 MMHG | WEIGHT: 293 LBS | BODY MASS INDEX: 43.4 KG/M2 | DIASTOLIC BLOOD PRESSURE: 81 MMHG | RESPIRATION RATE: 16 BRPM

## 2024-11-12 DIAGNOSIS — M54.16 LUMBAR RADICULOPATHY, CHRONIC: Primary | Chronic | ICD-10-CM

## 2024-11-12 DIAGNOSIS — M15.0 PRIMARY OSTEOARTHRITIS INVOLVING MULTIPLE JOINTS: Chronic | ICD-10-CM

## 2024-11-12 DIAGNOSIS — Z79.899 ENCOUNTER FOR LONG-TERM (CURRENT) USE OF MEDICATIONS: ICD-10-CM

## 2024-11-12 DIAGNOSIS — M43.16 SPONDYLOLISTHESIS OF LUMBAR REGION: Chronic | ICD-10-CM

## 2024-11-12 LAB
CTP QC/QA: YES
POC (AMP) AMPHETAMINE: NEGATIVE
POC (BAR) BARBITURATES: NEGATIVE
POC (BUP) BUPRENORPHINE: NEGATIVE
POC (BZO) BENZODIAZEPINES: NEGATIVE
POC (COC) COCAINE: NEGATIVE
POC (MDMA) METHYLENEDIOXYMETHAMPHETAMINE 3,4: NEGATIVE
POC (MET) METHAMPHETAMINE: NEGATIVE
POC (MOP) OPIATES: NEGATIVE
POC (MTD) METHADONE: NEGATIVE
POC (OXY) OXYCODONE: NEGATIVE
POC (PCP) PHENCYCLIDINE: NEGATIVE
POC (TCA) TRICYCLIC ANTIDEPRESSANTS: NEGATIVE
POC TEMPERATURE (URINE): 90
POC THC: NEGATIVE

## 2024-11-12 PROCEDURE — 3077F SYST BP >= 140 MM HG: CPT | Mod: CPTII,,, | Performed by: PHYSICIAN ASSISTANT

## 2024-11-12 PROCEDURE — 96372 THER/PROPH/DIAG INJ SC/IM: CPT | Mod: PBBFAC | Performed by: PHYSICIAN ASSISTANT

## 2024-11-12 PROCEDURE — 1159F MED LIST DOCD IN RCRD: CPT | Mod: CPTII,,, | Performed by: PHYSICIAN ASSISTANT

## 2024-11-12 PROCEDURE — 3008F BODY MASS INDEX DOCD: CPT | Mod: CPTII,,, | Performed by: PHYSICIAN ASSISTANT

## 2024-11-12 PROCEDURE — 99999PBSHW POCT URINE DRUG SCREEN PRESUMP: Mod: PBBFAC,,,

## 2024-11-12 PROCEDURE — 80305 DRUG TEST PRSMV DIR OPT OBS: CPT | Mod: PBBFAC | Performed by: PHYSICIAN ASSISTANT

## 2024-11-12 PROCEDURE — 99999 PR PBB SHADOW E&M-EST. PATIENT-LVL V: CPT | Mod: PBBFAC,,, | Performed by: PHYSICIAN ASSISTANT

## 2024-11-12 PROCEDURE — 99215 OFFICE O/P EST HI 40 MIN: CPT | Mod: PBBFAC | Performed by: PHYSICIAN ASSISTANT

## 2024-11-12 PROCEDURE — 99999PBSHW PR PBB SHADOW TECHNICAL ONLY FILED TO HB: Mod: PBBFAC,,,

## 2024-11-12 PROCEDURE — 99204 OFFICE O/P NEW MOD 45 MIN: CPT | Mod: S$PBB,25,, | Performed by: PHYSICIAN ASSISTANT

## 2024-11-12 PROCEDURE — 3079F DIAST BP 80-89 MM HG: CPT | Mod: CPTII,,, | Performed by: PHYSICIAN ASSISTANT

## 2024-11-12 RX ORDER — CELECOXIB 200 MG/1
200 CAPSULE ORAL DAILY
Qty: 30 CAPSULE | Refills: 0 | Status: SHIPPED | OUTPATIENT
Start: 2024-11-12

## 2024-11-12 RX ORDER — KETOROLAC TROMETHAMINE 30 MG/ML
30 INJECTION, SOLUTION INTRAMUSCULAR; INTRAVENOUS
Status: COMPLETED | OUTPATIENT
Start: 2024-11-12 | End: 2024-11-12

## 2024-11-12 RX ADMIN — KETOROLAC TROMETHAMINE 30 MG: 30 INJECTION, SOLUTION INTRAMUSCULAR at 01:11

## 2024-11-12 NOTE — PATIENT INSTRUCTIONS

## 2024-11-19 ENCOUNTER — PATIENT MESSAGE (OUTPATIENT)
Dept: RESEARCH | Facility: HOSPITAL | Age: 49
End: 2024-11-19
Payer: COMMERCIAL

## 2024-12-05 ENCOUNTER — HOSPITAL ENCOUNTER (OUTPATIENT)
Facility: HOSPITAL | Age: 49
Discharge: HOME | End: 2024-12-05
Attending: PAIN MEDICINE | Admitting: PAIN MEDICINE
Payer: COMMERCIAL

## 2024-12-05 VITALS
DIASTOLIC BLOOD PRESSURE: 55 MMHG | RESPIRATION RATE: 25 BRPM | WEIGHT: 293 LBS | SYSTOLIC BLOOD PRESSURE: 111 MMHG | OXYGEN SATURATION: 97 % | TEMPERATURE: 98 F | BODY MASS INDEX: 43.4 KG/M2 | HEART RATE: 84 BPM | HEIGHT: 69 IN

## 2024-12-05 DIAGNOSIS — M54.16 LUMBAR RADICULOPATHY, CHRONIC: Primary | Chronic | ICD-10-CM

## 2024-12-05 DIAGNOSIS — M54.16 LUMBAR RADICULOPATHY: ICD-10-CM

## 2024-12-05 LAB — GLUCOSE SERPL-MCNC: 244 MG/DL (ref 70–105)

## 2024-12-05 PROCEDURE — 62323 NJX INTERLAMINAR LMBR/SAC: CPT | Mod: ,,, | Performed by: PAIN MEDICINE

## 2024-12-05 PROCEDURE — 25500020 PHARM REV CODE 255: Performed by: PAIN MEDICINE

## 2024-12-05 PROCEDURE — 62323 NJX INTERLAMINAR LMBR/SAC: CPT | Performed by: PAIN MEDICINE

## 2024-12-05 PROCEDURE — 82962 GLUCOSE BLOOD TEST: CPT

## 2024-12-05 PROCEDURE — 63600175 PHARM REV CODE 636 W HCPCS: Performed by: PAIN MEDICINE

## 2024-12-05 RX ORDER — IOPAMIDOL 612 MG/ML
INJECTION, SOLUTION INTRATHECAL CODE/TRAUMA/SEDATION MEDICATION
Status: DISCONTINUED | OUTPATIENT
Start: 2024-12-05 | End: 2024-12-05 | Stop reason: HOSPADM

## 2024-12-05 RX ORDER — TRIAMCINOLONE ACETONIDE 40 MG/ML
INJECTION, SUSPENSION INTRA-ARTICULAR; INTRAMUSCULAR CODE/TRAUMA/SEDATION MEDICATION
Status: DISCONTINUED | OUTPATIENT
Start: 2024-12-05 | End: 2024-12-05 | Stop reason: HOSPADM

## 2024-12-05 RX ORDER — SODIUM CHLORIDE 9 MG/ML
INJECTION, SOLUTION INTRAVENOUS CONTINUOUS
Status: DISCONTINUED | OUTPATIENT
Start: 2024-12-05 | End: 2024-12-05 | Stop reason: HOSPADM

## 2024-12-05 NOTE — OP NOTE
"Procedure Note    Procedure Date: 12/5/2024    Procedure Performed:  Lumbar interlaminar epidural steroid injection under fluoroscopy at L4-5    Indications: Patient failed conservative therapy.      Pre-op diagnosis: Lumbar Radiculopathy    Post-op diagnosis: same    Physician: Penny Scott MD    Anesthesia: Local    Medications injected: Kenalog 40mg,  2 mL sterile preservative-free normal saline.    Local anesthetic used: 1% Lidocaine, 5 ml    Estimated Blood Loss: None    Complications:  None    Technique:  The patient was interviewed in the holding area and Risks/Benefits were discussed, including, but not limited to, the possibility of new or different pain, bleeding or infection.   All questions were answered.  The patient understood and accepted risks.  Consent was verified and signed.   A time-out was taken to identify patient and procedure prior to starting the procedure. The patient was placed in the prone position on the fluoroscopy table. The area of the lumbar spine was prepped with Chloraprep and draped in a sterile manner. The L4-5  interspace was identified and marked under AP fluoroscopy. The skin and subcutaneous tissues overlying the targeted interspace were anesthetized with 3-5 mL of 1% lidocaine using a 25G 1.5" needle.  A 20G  3.5" Tuohy epidural needle was directed toward the interspace under fluoroscopic guidance until the ligamentum flavum was engaged. From this point, a loss of resistance technique with a pulsator syringe a was used to identify entrance of the needle into the epidural space. Once loss of resistance was observed 3mL of Isovue contrast solution was injected. An appropriate epidurogram was noted.  A 3mL mixture consisting of saline and 40 mg of kenalog was injected slowly and without resistance.  The needle was  removed and a sterile Band-Aid dressing was applied to the puncture site.  The patient tolerated the procedure well and was transferred to the .AC. in stable " condition.  The patient was monitored after the procedure and was given post-procedure and discharge instructions to follow at home. The patient was discharged in a stable condition and accompanied by an adult .    Epidurogram:5 mL allotment of Isovue M 300 contrast revealed excellent delineation from L3-5. There were no filling defects or obstruction to dye flow noted.  There was no  intravascular or intrathecal spread noted with dye flow.

## 2024-12-05 NOTE — PLAN OF CARE
Plan:  D/c pt via wheelchair at 1155  Informed pt if does not void in 8 hours to go to ER. Notify if redness, drainage, from injection site or fever over next 3-4 days. Rest and drink plenty of fluids for the remainder of the day. No lifting over 5 lbs. For the remainder of the day. Continue regular medications as prescribed. May take pain medications as prescribed.   100% IMPROVED

## 2024-12-05 NOTE — BRIEF OP NOTE
The  Discharge Note  Short Stay    Admit Date: 12/5/2024    Discharge Date: 12/5/2024    Attending Physician: Penny Scott     Discharge Provider: Penny Scott    Diagnosis:  Lumbar radiculopathy    Procedure performed:  L4-5 epidural steroid injection and epidurogram under fluoroscopy    Findings: Procedure tolerated well and without complications. Consistent with diagnosis.    EBL: 0cc    Specimens: None    Discharged Condition: Good    Final Diagnoses: Lumbar radiculopathy, chronic [M54.16]    Disposition: Home or Self Care    Hospital Course: No complications, uneventful    Outcome of Hospitalization, Treatment, Procedure, or Surgery:  Patient was admitted for outpatient interventional pain management procedure. The patient tolerated the procedure well with no complications.    Follow up/Patient Instructions:  Follow up as scheduled in Pain Management office in 3-4 weeks.  Patient has received instructions and follow up date and time.    Medications:  Continue previous medications

## 2024-12-10 ENCOUNTER — TELEPHONE (OUTPATIENT)
Dept: PAIN MEDICINE | Facility: CLINIC | Age: 49
End: 2024-12-10
Payer: COMMERCIAL

## 2024-12-10 NOTE — TELEPHONE ENCOUNTER
Appointment is made to follow up with Alejandro on 12/16/24 at 1:45 pm.  Patient is notified and voices understanding. TC  ----- Message from Penny Scott MD sent at 12/10/2024  1:20 PM CST -----  Regarding: RE: Having unbearable pain  Have patient to return for re-evaluation  ----- Message -----  From: Edy Turk LPN  Sent: 12/10/2024   1:07 PM CST  To: Penny Scott MD  Subject: RE: Having unbearable pain                       Patient states Friday after procedure she was unable to hold her urine and was nauseated, fever from 99.4 to 101.0 till Saturday. Use tylenol for fever. Patient states today symptoms are resolved but the pain came back Friday.  ----- Message -----  From: Queenie Kelley  Sent: 12/6/2024   3:13 PM CST  To: Tyler Nguyen  Subject: Having unbearable pain                           Who Called: Salina Dave    Caller is requesting assistance/information from provider's office.    Symptoms (please be specific): Back pain, not able to control urine, very sore, and nauseous   How long has patient had these symptoms:  2 days        Preferred Method of Contact: Phone Call  Patient's Preferred Phone Number on File: [phone]   Best Call Back Number, if different:  Additional Information: got an injection the previous day having back pain, not able to control urine, very sore and nauseous.

## 2024-12-10 NOTE — PROGRESS NOTES
Subjective:         Patient ID: Salina Dave is a 49 y.o. female.    Chief Complaint: Low-back Pain      Pain  This is a chronic problem. The current episode started more than 1 year ago. The problem occurs daily. The problem has been waxing and waning. Associated symptoms include arthralgias. Pertinent negatives include no anorexia, change in bowel habit, chest pain, chills, coughing, diaphoresis, fever, neck pain, rash, sore throat, swollen glands, urinary symptoms, vertigo or vomiting.     Review of Systems   Constitutional:  Negative for activity change, appetite change, chills, diaphoresis, fever and unexpected weight change.   HENT:  Negative for drooling, ear discharge, ear pain, facial swelling, nosebleeds, sore throat, trouble swallowing, voice change and goiter.    Eyes:  Negative for photophobia, pain, discharge, redness and visual disturbance.   Respiratory:  Negative for apnea, cough, choking, chest tightness, shortness of breath, wheezing and stridor.    Cardiovascular:  Negative for chest pain, palpitations and leg swelling.   Gastrointestinal:  Negative for abdominal distention, anorexia, change in bowel habit, diarrhea, rectal pain, vomiting and fecal incontinence.   Endocrine: Negative for cold intolerance, heat intolerance, polydipsia, polyphagia and polyuria.   Genitourinary:  Negative for bladder incontinence, dysuria, flank pain, frequency and hot flashes.   Musculoskeletal:  Positive for arthralgias, back pain and leg pain. Negative for neck pain.   Integumentary:  Negative for color change, pallor and rash.   Allergic/Immunologic: Negative for immunocompromised state.   Neurological:  Negative for dizziness, vertigo, seizures, syncope, facial asymmetry, speech difficulty, light-headedness, memory loss and coordination difficulties.   Hematological:  Negative for adenopathy. Does not bruise/bleed easily.   Psychiatric/Behavioral:  Negative for agitation, behavioral problems, confusion,  "decreased concentration, dysphoric mood, hallucinations, self-injury and suicidal ideas. The patient is not nervous/anxious and is not hyperactive.            Past Medical History:   Diagnosis Date    Asthma     Diabetes mellitus     Gout, unspecified     Thyroid disease      Past Surgical History:   Procedure Laterality Date    EPIDURAL STEROID INJECTION N/A 2024    Procedure: Injection, Steroid, Epidural, L4/5;  Surgeon: Penny Scott MD;  Location: [facility];  Service: Pain Management;  Laterality: N/A;    FINGER AMPUTATION Left     PARTIAL INDEX FINGER    HYSTERECTOMY      PARTIAL    OPEN REDUCTION AND INTERNAL FIXATION (ORIF) OF INJURY OF ANKLE Right      Social History     Socioeconomic History    Marital status: Single   Tobacco Use    Smoking status: Former     Current packs/day: 0.00     Types: Cigarettes     Quit date:      Years since quittin.9    Smokeless tobacco: Never   Substance and Sexual Activity    Alcohol use: Never    Drug use: Never    Sexual activity: Not Currently     Family History   Family history unknown: Yes     Review of patient's allergies indicates:   Allergen Reactions    Tramadol Rash        Objective:  Vitals:    24 1331   BP: 128/75   Pulse: 90   Resp: 20   Weight: (!) 153.3 kg (338 lb)   Height: 5' 9" (1.753 m)   PainSc:   9           Physical Exam  Vitals and nursing note reviewed. Exam conducted with a chaperone present.   Constitutional:       General: She is awake. She is not in acute distress.     Appearance: Normal appearance. She is not ill-appearing, toxic-appearing or diaphoretic.   HENT:      Head: Normocephalic and atraumatic.      Nose: Nose normal.      Mouth/Throat:      Mouth: Mucous membranes are moist.      Pharynx: Oropharynx is clear.   Eyes:      Conjunctiva/sclera: Conjunctivae normal.      Pupils: Pupils are equal, round, and reactive to light.   Cardiovascular:      Rate and Rhythm: Normal rate.   Pulmonary:      Effort: " Pulmonary effort is normal. No respiratory distress.   Abdominal:      Palpations: Abdomen is soft.      Tenderness: There is no guarding.   Musculoskeletal:         General: Normal range of motion.      Cervical back: Normal range of motion and neck supple. No rigidity.   Skin:     General: Skin is warm and dry.      Coloration: Skin is not jaundiced or pale.   Neurological:      General: No focal deficit present.      Mental Status: She is alert and oriented to person, place, and time. Mental status is at baseline.      Cranial Nerves: No cranial nerve deficit (II-XII).   Psychiatric:         Mood and Affect: Mood normal.         Behavior: Behavior normal. Behavior is cooperative.         Thought Content: Thought content normal.           FL Fluoro for Pain Management  See OP Notes for results.     IMPRESSION: See OP Notes for results.     This procedure was auto-finalized by: Virtual Radiologist       Admission on 12/05/2024, Discharged on 12/05/2024   Component Date Value Ref Range Status    POC Glucose 12/05/2024 244 (H)  70 - 105 mg/dL Final   Office Visit on 11/12/2024   Component Date Value Ref Range Status    POC Amphetamines 11/12/2024 Negative  Negative, Inconclusive Final    POC Barbiturates 11/12/2024 Negative  Negative, Inconclusive Final    POC Benzodiazepines 11/12/2024 Negative  Negative, Inconclusive Final    POC Cocaine 11/12/2024 Negative  Negative, Inconclusive Final    POC THC 11/12/2024 Negative  Negative, Inconclusive Final    POC Methadone 11/12/2024 Negative  Negative, Inconclusive Final    POC Methamphetamine 11/12/2024 Negative  Negative, Inconclusive Final    POC Opiates 11/12/2024 Negative  Negative, Inconclusive Final    POC Oxycodone 11/12/2024 Negative  Negative, Inconclusive Final    POC Phencyclidine 11/12/2024 Negative  Negative, Inconclusive Final    POC Methylenedioxymethamphetamine * 11/12/2024 Negative  Negative, Inconclusive Final    POC Tricyclic Antidepressants 11/12/2024  Negative  Negative, Inconclusive Final    POC Buprenorphine 11/12/2024 Negative   Final     Acceptable 11/12/2024 Yes   Final    POC Temperature (Urine) 11/12/2024 90   Final    Creatinine, U 11/12/2024 170.9  mg/dL Final    Specific Gravity 11/12/2024 1.026   Final    pH 11/12/2024 4.9   Final    Oxidants 11/12/2024 Negative  Cutoff: 200 mg/L Final    Comment 11/12/2024 Normal   Final    Codeine 11/12/2024 Not Detected  Cutoff: 25 ng/mL Final    C6BG 11/12/2024 Not Detected  Cutoff: 100 ng/mL Final    Morphine 11/12/2024 Not Detected  Cutoff: 25 ng/mL Final    M6BG 11/12/2024 Not Detected  Cutoff: 100 ng/mL Final    6 Monoacetylmorphine 11/12/2024 Not Detected  Cutoff: 25 ng/mL Final    Hydrocodone 11/12/2024 Not Detected  Cutoff: 25 ng/mL Final    Norhydrocodone 11/12/2024 Not Detected  Cutoff: 25 ng/mL Final    Dihydrocodeine 11/12/2024 Not Detected  Cutoff: 25 ng/mL Final    Hydromorphone 11/12/2024 Not Detected  Cutoff: 25 ng/mL Final    Hydromorphone-3-beta-glucuronide 11/12/2024 Not Detected  Cutoff: 100 ng/mL Final    Oxycodone 11/12/2024 Not Detected  Cutoff: 25 ng/mL Final    Noroxycodone 11/12/2024 Not Detected  Cutoff: 25 ng/mL Final    Oxymorphone 11/12/2024 Not Detected  Cutoff: 25 ng/mL Final    Oxymorphone-3-beta-glucuronid 11/12/2024 Not Detected  Cutoff: 100 ng/mL Final    Noroxymorphone 11/12/2024 Not Detected  Cutoff: 25 ng/mL Final    Fentanyl 11/12/2024 Not Detected  Cutoff: 2 ng/mL Final    Norfentanyl 11/12/2024 Not Detected  Cutoff: 2 ng/mL Final    Meperidine 11/12/2024 Not Detected  Cutoff: 25 ng/mL Final    Normeperidine 11/12/2024 Not Detected  Cutoff: 25 ng/mL Final    Naloxone 11/12/2024 Not Detected  Cutoff: 25 ng/mL Final    Naloxone-3-beta-glucuronide 11/12/2024 Not Detected  Cutoff: 100 ng/mL Final    Methadone 11/12/2024 Not Detected  Cutoff: 25 ng/mL Final    EDDP 11/12/2024 Not Detected  Cutoff: 25 ng/mL Final    Propoxyphene 11/12/2024 Not Detected  Cutoff: 25  ng/mL Final    Norpropoxyphene 11/12/2024 Not Detected  Cutoff: 25 ng/mL Final    Tramadol 11/12/2024 Not Detected  Cutoff: 25 ng/mL Final    O-desmethyltramadol 11/12/2024 Not Detected  Cutoff: 25 ng/mL Final    Tapentadol 11/12/2024 Not Detected  Cutoff: 25 ng/mL Final    N-Desmethyltapentadol 11/12/2024 Not Detected  Cutoff: 50 ng/mL Final    M TAPENTADOL-BETA-GLUCURONIDE 11/12/2024 Not Detected  Cutoff: 100 ng/mL Final    Buprenorphine 11/12/2024 Not Detected  Cutoff: 5 ng/mL Final    Norbuprenorphine 11/12/2024 Not Detected  Cutoff: 5 ng/mL Final    Norbuprenorphine glucuronide 11/12/2024 Not Detected  Cutoff: 20 ng/mL Final    Opioid Interpretation 11/12/2024 SEE COMMENTS   Final    Cocaine 11/12/2024 Negative  Cutoff: 150 ng/mL Final    Tetrahydrocannabinol 11/12/2024 Negative  Cutoff: 50 ng/mL Final    Alprazolam 11/12/2024 Not Detected  Cutoff: 10 ng/mL Final    Alpha-Hydroxyalprazolam 11/12/2024 Not Detected  Cutoff: 10 ng/mL Final    Alpha-Hydroxyalprazolam Glucuronide 11/12/2024 Not Detected  Cutoff: 50 ng/mL Final    Chlordiazepoxide 11/12/2024 Not Detected  Cutoff: 10 ng/mL Final    Clobazam 11/12/2024 Not Detected  Cutoff: 10 ng/mL Final    N-Desmethylclobazam 11/12/2024 Not Detected  Cutoff: 200 ng/mL Final    Clonazepam 11/12/2024 Not Detected  Cutoff: 10 ng/mL Final    7-aminoclonazepam 11/12/2024 Not Detected  Cutoff: 10 ng/mL Final    Diazepam 11/12/2024 Not Detected  Cutoff: 10 ng/mL Final    Nordiazepam 11/12/2024 Not Detected  Cutoff: 10 ng/mL Final    Flunitrazepam 11/12/2024 Not Detected  Cutoff: 10 ng/mL Final    7-aminoflunitrazepam 11/12/2024 Not Detected  Cutoff: 10 ng/mL Final    Flurazepam 11/12/2024 Not Detected  Cutoff: 10 ng/mL Final    2-Hydroxy Ethyl Flurazepam 11/12/2024 Not Detected  Cutoff: 10 ng/mL Final    Lorazepam 11/12/2024 Not Detected  Cutoff: 10 ng/mL Final    Lorazepam Glucuronide 11/12/2024 Not Detected  Cutoff: 50 ng/mL Final    Midazolam 11/12/2024 Not Detected   Cutoff: 10 ng/mL Final    Alpha-Hydroxy Midazolam 11/12/2024 Not Detected  Cutoff: 10 ng/mL Final    Oxazepam 11/12/2024 Not Detected  Cutoff: 10 ng/mL Final    Oxazepam Glucuronide 11/12/2024 Not Detected  Cutoff: 50 ng/mL Final    Prazepam 11/12/2024 Not Detected  Cutoff: 10 ng/mL Final    Temazepam 11/12/2024 Not Detected  Cutoff: 10 ng/mL Final    Temazepam Glucuronide 11/12/2024 Not Detected  Cutoff: 50 ng/mL Final    Triazolam 11/12/2024 Not Detected  Cutoff: 10 ng/mL Final    Alpha-Hydroxy Triazolam 11/12/2024 Not Detected  Cutoff: 10 ng/mL Final    Zolpidem 11/12/2024 Not Detected  Cutoff: 10 ng/mL Final    Zolpidem Phenyl-4-Carboxylic acid 11/12/2024 Not Detected  Cutoff: 10 ng/mL Final    Benzodiazepine Interpretation 11/12/2024 SEE COMMENTS   Final    List Patient's Current Medications 11/12/2024 na   Final    Methamphetamine 11/12/2024 Not Detected  Cutoff: 100 ng/mL Final    Amphetamine 11/12/2024 Not Detected  Cutoff: 100 ng/mL Final    3,4-methylenedioxymethamphetamine * 11/12/2024 Not Detected  Cutoff: 100 ng/mL Final    3,6-fpiiwkhzxlqcan-S-ethylamphetam* 11/12/2024 Not Detected  Cutoff: 100 ng/mL Final    3,4-methylenedioxyamphetamine (MDA) 11/12/2024 Not Detected  Cutoff: 100 ng/mL Final    Ephedrine 11/12/2024 Not Detected  Cutoff: 100 ng/mL Final    Pseudoephedrine 11/12/2024 Not Detected  Cutoff: 100 ng/mL Final    Phentermine 11/12/2024 Not Detected  Cutoff: 100 ng/mL Final    Phencyclidine (PCP) 11/12/2024 Not Detected  Cutoff: 20 ng/mL Final    Methylphenidate 11/12/2024 Not Detected  Cutoff: 20 ng/mL Final    Ritalinic acid 11/12/2024 Not Detected  Cutoff: 100 ng/mL Final    Stimulant Interpretation 11/12/2024 SEE COMMENTS   Final    Barbiturates 11/12/2024 Negative  Cutoff: 200 ng/mL Final         No orders of the defined types were placed in this encounter.      Requested Prescriptions     Signed Prescriptions Disp Refills    acetaminophen-codeine 300-30mg (TYLENOL #3) 300-30 mg Tab 60  tablet 0     Sig: Take 1 tablet by mouth every 12 (twelve) hours as needed (pain).    naloxone (NARCAN) 4 mg/actuation Spry 1 each 0     Si spray (4 mg total) by Nasal route once. Call 911, One sprays alternate nostril, may repeat 2-3 minutes as needed for 1 dose       Assessment:     1. Lumbar radiculopathy, chronic    2. Spondylolisthesis of lumbar region    3. Primary osteoarthritis involving multiple joints             A's of Opioid Risk Assessment  Activity:  Current medication helps perform ADL.   Analgesia:Patients pain is partially controlled by current medication. Patient has tried OTC medications such as Tylenol and Ibuprofen with out relief.   Adverse Effects: Patient denies constipation or sedation.  Aberrant Behavior:  reviewed with no aberrant drug seeking/taking behavior.  Overdose reversal drug naloxone discussed     Drug screen reviewed    X-rays lumbar spine [facility] 2024 reviewed show:   On the AP there is lumbar curvature with trunk shift to the left.  There are 5 non-rib-bearing lumbar vertebrae.  On the lateral there is decreased lumbar lordosis.  There is spondylotic disease with decreased disc height and osteophyte formation noted.  Grade 1 anterolisthesis at L4-5.     MRI lumbar spine [facility] 2024 reviewed shows:   Herniated disc free fragment T12-L1 not appreciated on this MRI    At L3-4 there is moderate right foraminal stenosis   At L4-5 there is grade 1 spondylolisthesis.  Severe right and moderate left foraminal stenosis   At L5-S1 there is a left lateral disc herniation.  Moderate right and severe left foraminal stenosis          Plan:    Narcan 2024    Follows Dr. Colmenares, spine surgery [facility]      Follow-up after lumbar L4/5 ROSELYN # 1 2024  She states procedure did not help    Procedure note/fluoro images reviewed    Declines further surgical evaluation    Declines any further procedures    Requesting options for chronic pain  related to her back    She denies loss of bowel or bladder function     She states she completed physical therapy [facility] November 2024    Trial Tylenol #3 1 p.o. q.12 hours 60 tablets    Continue home exercise program as directed    Follow-up 3 weeks, drug screen, pill count    Dr. Scott December 2025    Bring original prescription medication bottles/container/box with labels to each visit

## 2024-12-16 ENCOUNTER — OFFICE VISIT (OUTPATIENT)
Dept: PAIN MEDICINE | Facility: CLINIC | Age: 49
End: 2024-12-16
Payer: COMMERCIAL

## 2024-12-16 VITALS
WEIGHT: 293 LBS | HEIGHT: 69 IN | RESPIRATION RATE: 20 BRPM | DIASTOLIC BLOOD PRESSURE: 75 MMHG | BODY MASS INDEX: 43.4 KG/M2 | HEART RATE: 90 BPM | SYSTOLIC BLOOD PRESSURE: 128 MMHG

## 2024-12-16 DIAGNOSIS — M43.16 SPONDYLOLISTHESIS OF LUMBAR REGION: Chronic | ICD-10-CM

## 2024-12-16 DIAGNOSIS — M54.16 LUMBAR RADICULOPATHY, CHRONIC: Primary | Chronic | ICD-10-CM

## 2024-12-16 DIAGNOSIS — M15.0 PRIMARY OSTEOARTHRITIS INVOLVING MULTIPLE JOINTS: Chronic | ICD-10-CM

## 2024-12-16 PROCEDURE — 3078F DIAST BP <80 MM HG: CPT | Mod: CPTII,,, | Performed by: PHYSICIAN ASSISTANT

## 2024-12-16 PROCEDURE — 99999 PR PBB SHADOW E&M-EST. PATIENT-LVL IV: CPT | Mod: PBBFAC,,, | Performed by: PHYSICIAN ASSISTANT

## 2024-12-16 PROCEDURE — 99214 OFFICE O/P EST MOD 30 MIN: CPT | Mod: S$PBB,,, | Performed by: PHYSICIAN ASSISTANT

## 2024-12-16 PROCEDURE — 1159F MED LIST DOCD IN RCRD: CPT | Mod: CPTII,,, | Performed by: PHYSICIAN ASSISTANT

## 2024-12-16 PROCEDURE — 99214 OFFICE O/P EST MOD 30 MIN: CPT | Mod: PBBFAC | Performed by: PHYSICIAN ASSISTANT

## 2024-12-16 PROCEDURE — 3008F BODY MASS INDEX DOCD: CPT | Mod: CPTII,,, | Performed by: PHYSICIAN ASSISTANT

## 2024-12-16 PROCEDURE — 3074F SYST BP LT 130 MM HG: CPT | Mod: CPTII,,, | Performed by: PHYSICIAN ASSISTANT

## 2024-12-16 RX ORDER — NALOXONE HYDROCHLORIDE 4 MG/.1ML
1 SPRAY NASAL ONCE
Qty: 1 EACH | Refills: 0 | Status: SHIPPED | OUTPATIENT
Start: 2024-12-16 | End: 2024-12-16

## 2024-12-16 RX ORDER — ACETAMINOPHEN AND CODEINE PHOSPHATE 300; 30 MG/1; MG/1
1 TABLET ORAL EVERY 12 HOURS PRN
Qty: 60 TABLET | Refills: 0 | Status: SHIPPED | OUTPATIENT
Start: 2024-12-16 | End: 2025-01-15

## 2025-02-06 ENCOUNTER — HOSPITAL ENCOUNTER (EMERGENCY)
Facility: HOSPITAL | Age: 50
Discharge: HOME | End: 2025-02-06
Payer: COMMERCIAL

## 2025-02-06 VITALS
SYSTOLIC BLOOD PRESSURE: 141 MMHG | HEIGHT: 69 IN | HEART RATE: 99 BPM | TEMPERATURE: 99 F | DIASTOLIC BLOOD PRESSURE: 81 MMHG | WEIGHT: 293 LBS | OXYGEN SATURATION: 96 % | BODY MASS INDEX: 43.4 KG/M2 | RESPIRATION RATE: 18 BRPM

## 2025-02-06 DIAGNOSIS — K04.7 DENTAL ABSCESS: Primary | ICD-10-CM

## 2025-02-06 PROCEDURE — 99284 EMERGENCY DEPT VISIT MOD MDM: CPT | Mod: ,,, | Performed by: NURSE PRACTITIONER

## 2025-02-06 PROCEDURE — 96372 THER/PROPH/DIAG INJ SC/IM: CPT | Performed by: NURSE PRACTITIONER

## 2025-02-06 PROCEDURE — 99284 EMERGENCY DEPT VISIT MOD MDM: CPT | Mod: 25

## 2025-02-06 PROCEDURE — 63600175 PHARM REV CODE 636 W HCPCS: Performed by: NURSE PRACTITIONER

## 2025-02-06 RX ORDER — KETOROLAC TROMETHAMINE 30 MG/ML
30 INJECTION, SOLUTION INTRAMUSCULAR; INTRAVENOUS
Status: COMPLETED | OUTPATIENT
Start: 2025-02-06 | End: 2025-02-06

## 2025-02-06 RX ORDER — METHYLPREDNISOLONE SOD SUCC 125 MG
125 VIAL (EA) INJECTION
Status: COMPLETED | OUTPATIENT
Start: 2025-02-06 | End: 2025-02-06

## 2025-02-06 RX ORDER — AMPICILLIN 2 G/1
2 INJECTION, POWDER, FOR SOLUTION INTRAVENOUS ONCE
Status: DISCONTINUED | OUTPATIENT
Start: 2025-02-06 | End: 2025-02-06

## 2025-02-06 RX ORDER — CLINDAMYCIN PHOSPHATE 150 MG/ML
600 INJECTION, SOLUTION INTRAVENOUS
Status: COMPLETED | OUTPATIENT
Start: 2025-02-06 | End: 2025-02-06

## 2025-02-06 RX ORDER — AMPICILLIN AND SULBACTAM 2; 1 G/1; G/1
3 INJECTION, POWDER, FOR SOLUTION INTRAMUSCULAR; INTRAVENOUS
Status: DISCONTINUED | OUTPATIENT
Start: 2025-02-06 | End: 2025-02-06

## 2025-02-06 RX ORDER — AMOXICILLIN AND CLAVULANATE POTASSIUM 875; 125 MG/1; MG/1
1 TABLET, FILM COATED ORAL 2 TIMES DAILY
Qty: 14 TABLET | Refills: 0 | Status: SHIPPED | OUTPATIENT
Start: 2025-02-06 | End: 2025-02-07 | Stop reason: SDUPTHER

## 2025-02-06 RX ADMIN — METHYLPREDNISOLONE SODIUM SUCCINATE 125 MG: 125 INJECTION, POWDER, FOR SOLUTION INTRAMUSCULAR; INTRAVENOUS at 07:02

## 2025-02-06 RX ADMIN — CLINDAMYCIN PHOSPHATE 600 MG: 150 INJECTION, SOLUTION INTRAMUSCULAR; INTRAVENOUS at 07:02

## 2025-02-06 RX ADMIN — KETOROLAC TROMETHAMINE 30 MG: 30 INJECTION, SOLUTION INTRAMUSCULAR at 07:02

## 2025-02-07 RX ORDER — AMOXICILLIN AND CLAVULANATE POTASSIUM 875; 125 MG/1; MG/1
1 TABLET, FILM COATED ORAL 2 TIMES DAILY
Qty: 14 TABLET | Refills: 0 | Status: SHIPPED | OUTPATIENT
Start: 2025-02-07 | End: 2025-03-03

## 2025-02-07 NOTE — ED PROVIDER NOTES
Encounter Date: 2025       History     Chief Complaint   Patient presents with    Jaw Pain    Dental Pain    Otalgia     right     51 y/o AAF presents with c/o right lower jaw pain and swelling that started on yesterday and has progressively worsened. She denies fever or chills. She has had no nausea or vomiting. She took Tylenol at 1200 PM today but has had nothing further for her symptoms. She has not seen a dentist. She states pain is worse when trying to eat or talk.     The history is provided by the patient.     Review of patient's allergies indicates:   Allergen Reactions    Tramadol Rash     Past Medical History:   Diagnosis Date    Asthma     Diabetes mellitus     Gout, unspecified     Thyroid disease      Past Surgical History:   Procedure Laterality Date    EPIDURAL STEROID INJECTION N/A 2024    Procedure: Injection, Steroid, Epidural, L4/5;  Surgeon: Penny Scott MD;  Location: [facility];  Service: Pain Management;  Laterality: N/A;    FINGER AMPUTATION Left     PARTIAL INDEX FINGER    HYSTERECTOMY      PARTIAL    OPEN REDUCTION AND INTERNAL FIXATION (ORIF) OF INJURY OF ANKLE Right      Family History   Family history unknown: Yes     Social History     Tobacco Use    Smoking status: Former     Current packs/day: 0.00     Types: Cigarettes     Quit date:      Years since quittin.1    Smokeless tobacco: Never   Substance Use Topics    Alcohol use: Never    Drug use: Never     Review of Systems   All other systems reviewed and are negative.      Physical Exam     Initial Vitals [25 1830]   BP Pulse Resp Temp SpO2   112/76 102 18 98.7 °F (37.1 °C) 96 %      MAP       --         Physical Exam    Constitutional: She appears well-developed and well-nourished. She is cooperative.  Non-toxic appearance.   BMI >30   HENT: Mouth/Throat: Dental abscesses (right lower jaw) and dental caries present.   Cardiovascular:  Normal rate, regular rhythm and normal heart sounds.            Pulmonary/Chest: Effort normal and breath sounds normal.     Neurological: She is alert and oriented to person, place, and time. GCS eye subscore is 4. GCS verbal subscore is 5. GCS motor subscore is 6.   Skin: Skin is warm, dry and intact. Capillary refill takes less than 2 seconds.         Medical Screening Exam   See Full Note    ED Course   Procedures  Labs Reviewed - No data to display       Imaging Results    None          Medications   methylPREDNISolone sodium succinate injection 125 mg (125 mg Intramuscular Given 2/6/25 1925)   ketorolac injection 30 mg (30 mg Intramuscular Given 2/6/25 1925)   clindamycin injection 600 mg (600 mg Intramuscular Given 2/6/25 1926)     Medical Decision Making  49 y/o AAF presents with c/o right lower jaw pain and swelling that started on yesterday and has progressively worsened. She denies fever or chills. She has had no nausea or vomiting. She took Tylenol at 1200 PM today but has had nothing further for her symptoms. She has not seen a dentist. She knows of no particular exacerbating or remitting factors. She states pain is worse when trying to eat or talk.     Problems Addressed:  Dental abscess:     Details: Pt afebrile, non toxic appearing. Given 1x dose of steroids to help with inflammation and hopefully swelling. Toradol for pain. IM Cleocin. Rx Augmentin, counseled on use and supportive measures. Follow up instructions given. Warning s/s discussed and return precautions given; the patient has v/u.      Risk  OTC drugs.  Prescription drug management.                                      Clinical Impression:   Final diagnoses:  [K04.7] Dental abscess (Primary)        ED Disposition Condition    Discharge Stable          ED Prescriptions       Medication Sig Dispense Start Date End Date Auth. Provider    amoxicillin-clavulanate 875-125mg (AUGMENTIN) 875-125 mg per tablet Take 1 tablet by mouth 2 (two) times daily. 14 tablet 2/6/2025 -- Juana Khanna FNP           Follow-up Information       Follow up With Specialties Details Why Contact Info    Dentist  Schedule an appointment as soon as possible for a visit                Juana Khanna FNP  02/06/25 [id number]

## 2025-02-07 NOTE — DISCHARGE INSTRUCTIONS
Use prescriptions as directed. Tylenol as needed for pain. Take your Celebrex as previously prescribed. Follow up with your dentist as soon as possible. Return to the ED for worsening signs and symptoms or otherwise as needed.

## 2025-02-07 NOTE — ED TRIAGE NOTES
50 year old female presents to ed with c/o jaw pain, toothache and right ear pain.   Patient states she is unable to open her mouth.  Swelling is noted to right side of face.   Symptoms started on 2/5/2025.  Patient reports taking tylenol at appx 1200 pm today

## 2025-03-03 ENCOUNTER — OFFICE VISIT (OUTPATIENT)
Dept: FAMILY MEDICINE | Facility: CLINIC | Age: 50
End: 2025-03-03
Payer: COMMERCIAL

## 2025-03-03 VITALS
DIASTOLIC BLOOD PRESSURE: 82 MMHG | SYSTOLIC BLOOD PRESSURE: 144 MMHG | HEIGHT: 69 IN | HEART RATE: 87 BPM | RESPIRATION RATE: 20 BRPM | BODY MASS INDEX: 43.4 KG/M2 | TEMPERATURE: 97 F | OXYGEN SATURATION: 98 % | WEIGHT: 293 LBS

## 2025-03-03 DIAGNOSIS — E87.6 HYPOKALEMIA: ICD-10-CM

## 2025-03-03 DIAGNOSIS — Z13.220 ENCOUNTER FOR SCREENING FOR LIPID DISORDER: ICD-10-CM

## 2025-03-03 DIAGNOSIS — I10 HYPERTENSION, UNSPECIFIED TYPE: ICD-10-CM

## 2025-03-03 DIAGNOSIS — M54.16 LUMBAR RADICULOPATHY, CHRONIC: Chronic | ICD-10-CM

## 2025-03-03 DIAGNOSIS — Z86.39 HISTORY OF HYPOTHYROIDISM: ICD-10-CM

## 2025-03-03 DIAGNOSIS — E66.01 OBESITIES, MORBID: ICD-10-CM

## 2025-03-03 DIAGNOSIS — Z87.39 HISTORY OF GOUT: ICD-10-CM

## 2025-03-03 DIAGNOSIS — Z11.4 SCREENING FOR HIV (HUMAN IMMUNODEFICIENCY VIRUS): ICD-10-CM

## 2025-03-03 DIAGNOSIS — Z11.59 ENCOUNTER FOR HEPATITIS C SCREENING TEST FOR LOW RISK PATIENT: ICD-10-CM

## 2025-03-03 DIAGNOSIS — M25.562 LEFT KNEE PAIN, UNSPECIFIED CHRONICITY: ICD-10-CM

## 2025-03-03 DIAGNOSIS — E55.9 VITAMIN D DEFICIENCY DISEASE: ICD-10-CM

## 2025-03-03 DIAGNOSIS — Z13.1 SCREENING FOR DIABETES MELLITUS (DM): ICD-10-CM

## 2025-03-03 DIAGNOSIS — E11.9 DIABETES MELLITUS TYPE II, NON INSULIN DEPENDENT: Primary | ICD-10-CM

## 2025-03-03 LAB
25(OH)D3 SERPL-MCNC: 41.2 NG/ML (ref 30–80)
ALBUMIN SERPL BCP-MCNC: 3.7 G/DL (ref 3.5–5)
ALBUMIN/GLOB SERPL: 1 {RATIO}
ALP SERPL-CCNC: 73 U/L (ref 40–150)
ALT SERPL W P-5'-P-CCNC: 23 U/L
ANION GAP SERPL CALCULATED.3IONS-SCNC: 14 MMOL/L (ref 7–16)
AST SERPL W P-5'-P-CCNC: 19 U/L (ref 5–34)
BASOPHILS # BLD AUTO: 0.05 K/UL (ref 0–0.2)
BASOPHILS NFR BLD AUTO: 0.5 % (ref 0–1)
BILIRUB SERPL-MCNC: 0.7 MG/DL
BUN SERPL-MCNC: 8 MG/DL (ref 10–20)
BUN/CREAT SERPL: 9 (ref 6–20)
CALCIUM SERPL-MCNC: 9.5 MG/DL (ref 8.4–10.2)
CHLORIDE SERPL-SCNC: 105 MMOL/L (ref 98–107)
CHOLEST SERPL-MCNC: 172 MG/DL
CHOLEST/HDLC SERPL: 4.6 {RATIO}
CO2 SERPL-SCNC: 25 MMOL/L (ref 22–29)
CREAT SERPL-MCNC: 0.9 MG/DL (ref 0.55–1.02)
DIFFERENTIAL METHOD BLD: ABNORMAL
EGFR (NO RACE VARIABLE) (RUSH/TITUS): 77 ML/MIN/1.73M2
EOSINOPHIL # BLD AUTO: 0.13 K/UL (ref 0–0.5)
EOSINOPHIL NFR BLD AUTO: 1.3 % (ref 1–4)
ERYTHROCYTE [DISTWIDTH] IN BLOOD BY AUTOMATED COUNT: 13.2 % (ref 11.5–14.5)
EST. AVERAGE GLUCOSE BLD GHB EST-MCNC: 197 MG/DL
GLOBULIN SER-MCNC: 3.7 G/DL (ref 2–4)
GLUCOSE SERPL-MCNC: 169 MG/DL (ref 74–100)
HBA1C MFR BLD HPLC: 8.5 %
HCT VFR BLD AUTO: 40.5 % (ref 38–47)
HCV AB SER QL: NORMAL
HDLC SERPL-MCNC: 37 MG/DL (ref 35–60)
HGB BLD-MCNC: 12.4 G/DL (ref 12–16)
HIV 1+O+2 AB SERPL QL: NORMAL
IMM GRANULOCYTES # BLD AUTO: 0.03 K/UL (ref 0–0.04)
IMM GRANULOCYTES NFR BLD: 0.3 % (ref 0–0.4)
LDLC SERPL CALC-MCNC: 88 MG/DL
LDLC/HDLC SERPL: 2.4 {RATIO}
LYMPHOCYTES # BLD AUTO: 2.86 K/UL (ref 1–4.8)
LYMPHOCYTES NFR BLD AUTO: 29.5 % (ref 27–41)
MCH RBC QN AUTO: 28.6 PG (ref 27–31)
MCHC RBC AUTO-ENTMCNC: 30.6 G/DL (ref 32–36)
MCV RBC AUTO: 93.5 FL (ref 80–96)
MONOCYTES # BLD AUTO: 0.49 K/UL (ref 0–0.8)
MONOCYTES NFR BLD AUTO: 5 % (ref 2–6)
MPC BLD CALC-MCNC: 10.9 FL (ref 9.4–12.4)
NEUTROPHILS # BLD AUTO: 6.15 K/UL (ref 1.8–7.7)
NEUTROPHILS NFR BLD AUTO: 63.4 % (ref 53–65)
NONHDLC SERPL-MCNC: 135 MG/DL
NRBC # BLD AUTO: 0 X10E3/UL
NRBC, AUTO (.00): 0 %
PLATELET # BLD AUTO: 273 K/UL (ref 150–400)
POTASSIUM SERPL-SCNC: 4 MMOL/L (ref 3.5–5.1)
PROT SERPL-MCNC: 7.4 G/DL (ref 6.4–8.3)
RBC # BLD AUTO: 4.33 M/UL (ref 4.2–5.4)
SODIUM SERPL-SCNC: 140 MMOL/L (ref 136–145)
T4 FREE SERPL-MCNC: 0.94 NG/DL (ref 0.7–1.48)
TRIGL SERPL-MCNC: 234 MG/DL (ref 37–140)
TSH SERPL DL<=0.005 MIU/L-ACNC: 2.39 UIU/ML (ref 0.35–4.94)
URATE SERPL-MCNC: 5 MG/DL (ref 2.6–6)
VLDLC SERPL-MCNC: 47 MG/DL
WBC # BLD AUTO: 9.71 K/UL (ref 4.5–11)

## 2025-03-03 PROCEDURE — 86803 HEPATITIS C AB TEST: CPT | Mod: ,,, | Performed by: CLINICAL MEDICAL LABORATORY

## 2025-03-03 PROCEDURE — 99214 OFFICE O/P EST MOD 30 MIN: CPT | Mod: ,,, | Performed by: NURSE PRACTITIONER

## 2025-03-03 PROCEDURE — 87389 HIV-1 AG W/HIV-1&-2 AB AG IA: CPT | Mod: ,,, | Performed by: CLINICAL MEDICAL LABORATORY

## 2025-03-03 PROCEDURE — 83036 HEMOGLOBIN GLYCOSYLATED A1C: CPT | Mod: ,,, | Performed by: CLINICAL MEDICAL LABORATORY

## 2025-03-03 PROCEDURE — 3052F HG A1C>EQUAL 8.0%<EQUAL 9.0%: CPT | Mod: CPTII,,, | Performed by: NURSE PRACTITIONER

## 2025-03-03 PROCEDURE — 82306 VITAMIN D 25 HYDROXY: CPT | Mod: ,,, | Performed by: CLINICAL MEDICAL LABORATORY

## 2025-03-03 PROCEDURE — 80050 GENERAL HEALTH PANEL: CPT | Mod: ,,, | Performed by: CLINICAL MEDICAL LABORATORY

## 2025-03-03 PROCEDURE — 84550 ASSAY OF BLOOD/URIC ACID: CPT | Mod: ,,, | Performed by: CLINICAL MEDICAL LABORATORY

## 2025-03-03 PROCEDURE — 84439 ASSAY OF FREE THYROXINE: CPT | Mod: ,,, | Performed by: CLINICAL MEDICAL LABORATORY

## 2025-03-03 PROCEDURE — 3077F SYST BP >= 140 MM HG: CPT | Mod: CPTII,,, | Performed by: NURSE PRACTITIONER

## 2025-03-03 PROCEDURE — 3079F DIAST BP 80-89 MM HG: CPT | Mod: CPTII,,, | Performed by: NURSE PRACTITIONER

## 2025-03-03 PROCEDURE — 3008F BODY MASS INDEX DOCD: CPT | Mod: CPTII,,, | Performed by: NURSE PRACTITIONER

## 2025-03-03 PROCEDURE — 80061 LIPID PANEL: CPT | Mod: ,,, | Performed by: CLINICAL MEDICAL LABORATORY

## 2025-03-03 RX ORDER — HYDROCHLOROTHIAZIDE 12.5 MG/1
12.5 TABLET ORAL EVERY MORNING
Qty: 30 TABLET | Refills: 1 | Status: SHIPPED | OUTPATIENT
Start: 2025-03-03

## 2025-03-03 RX ORDER — METFORMIN HYDROCHLORIDE 500 MG/1
1000 TABLET ORAL 2 TIMES DAILY WITH MEALS
Qty: 60 TABLET | Refills: 1 | Status: SHIPPED | OUTPATIENT
Start: 2025-03-03 | End: 2025-03-20

## 2025-03-03 RX ORDER — NALOXONE HYDROCHLORIDE 4 MG/.1ML
1 SPRAY NASAL ONCE
COMMUNITY
Start: 2024-12-16

## 2025-03-03 RX ORDER — GABAPENTIN 300 MG/1
300 CAPSULE ORAL 3 TIMES DAILY
Qty: 90 CAPSULE | Refills: 1 | Status: SHIPPED | OUTPATIENT
Start: 2025-03-03

## 2025-03-03 RX ORDER — POTASSIUM CHLORIDE 750 MG/1
10 TABLET, EXTENDED RELEASE ORAL DAILY
Qty: 30 TABLET | Refills: 1 | Status: SHIPPED | OUTPATIENT
Start: 2025-03-03

## 2025-03-03 RX ORDER — GLUCOSAM/CHONDRO/HERB 149/HYAL 750-100 MG
1 TABLET ORAL DAILY
COMMUNITY

## 2025-03-03 RX ORDER — INDOMETHACIN 50 MG/1
50 CAPSULE ORAL 2 TIMES DAILY WITH MEALS
Qty: 60 CAPSULE | Refills: 1 | Status: SHIPPED | OUTPATIENT
Start: 2025-03-03

## 2025-03-03 RX ORDER — GLIPIZIDE 10 MG/1
10 TABLET ORAL
Qty: 30 TABLET | Refills: 1 | Status: SHIPPED | OUTPATIENT
Start: 2025-03-03 | End: 2025-03-04 | Stop reason: SDUPTHER

## 2025-03-04 ENCOUNTER — RESULTS FOLLOW-UP (OUTPATIENT)
Dept: FAMILY MEDICINE | Facility: CLINIC | Age: 50
End: 2025-03-04

## 2025-03-04 DIAGNOSIS — Z13.1 SCREENING FOR DIABETES MELLITUS (DM): ICD-10-CM

## 2025-03-04 DIAGNOSIS — E78.2 MIXED HYPERLIPIDEMIA: ICD-10-CM

## 2025-03-04 DIAGNOSIS — M17.12 OSTEOARTHRITIS OF LEFT KNEE, UNSPECIFIED OSTEOARTHRITIS TYPE: Primary | ICD-10-CM

## 2025-03-04 RX ORDER — ATORVASTATIN CALCIUM 20 MG/1
20 TABLET, FILM COATED ORAL DAILY
Qty: 90 TABLET | Refills: 0 | Status: SHIPPED | OUTPATIENT
Start: 2025-03-04 | End: 2026-03-04

## 2025-03-04 RX ORDER — GLIPIZIDE 10 MG/1
10 TABLET ORAL 2 TIMES DAILY WITH MEALS
Qty: 180 TABLET | Refills: 0 | Status: SHIPPED | OUTPATIENT
Start: 2025-03-04

## 2025-03-04 NOTE — PROGRESS NOTES
Discussed lab and xray results and recommendations with pt via phone  No noted concerns  Voiced understanding

## 2025-03-15 ENCOUNTER — HOSPITAL ENCOUNTER (EMERGENCY)
Facility: HOSPITAL | Age: 50
Discharge: HOME | End: 2025-03-15
Payer: COMMERCIAL

## 2025-03-15 VITALS
HEIGHT: 69 IN | SYSTOLIC BLOOD PRESSURE: 132 MMHG | OXYGEN SATURATION: 97 % | WEIGHT: 293 LBS | TEMPERATURE: 99 F | HEART RATE: 100 BPM | DIASTOLIC BLOOD PRESSURE: 72 MMHG | RESPIRATION RATE: 18 BRPM | BODY MASS INDEX: 43.4 KG/M2

## 2025-03-15 DIAGNOSIS — R10.9 ABDOMINAL PAIN: ICD-10-CM

## 2025-03-15 DIAGNOSIS — K52.9 GASTROENTERITIS: Primary | ICD-10-CM

## 2025-03-15 LAB
ALBUMIN SERPL BCP-MCNC: 3.8 G/DL (ref 3.5–5)
ALBUMIN/GLOB SERPL: 1 {RATIO}
ALP SERPL-CCNC: 81 U/L (ref 40–150)
ALT SERPL W P-5'-P-CCNC: 22 U/L
ANION GAP SERPL CALCULATED.3IONS-SCNC: 17 MMOL/L (ref 7–16)
AST SERPL W P-5'-P-CCNC: 38 U/L (ref 11–45)
BASOPHILS # BLD AUTO: 0.05 K/UL (ref 0–0.2)
BASOPHILS NFR BLD AUTO: 0.5 % (ref 0–1)
BILIRUB SERPL-MCNC: 0.7 MG/DL
BILIRUB UR QL STRIP: NEGATIVE
BUN SERPL-MCNC: 8 MG/DL (ref 10–20)
BUN/CREAT SERPL: 8 (ref 6–20)
CALCIUM SERPL-MCNC: 10 MG/DL (ref 8.4–10.2)
CHLORIDE SERPL-SCNC: 101 MMOL/L (ref 98–107)
CLARITY UR: CLEAR
CO2 SERPL-SCNC: 25 MMOL/L (ref 22–29)
COLOR UR: COLORLESS
CREAT SERPL-MCNC: 0.95 MG/DL (ref 0.55–1.02)
DIFFERENTIAL METHOD BLD: ABNORMAL
EGFR (NO RACE VARIABLE) (RUSH/TITUS): 72 ML/MIN/1.73M2
EOSINOPHIL # BLD AUTO: 0.08 K/UL (ref 0–0.5)
EOSINOPHIL NFR BLD AUTO: 0.7 % (ref 1–4)
ERYTHROCYTE [DISTWIDTH] IN BLOOD BY AUTOMATED COUNT: 13.1 % (ref 11.5–14.5)
GLOBULIN SER-MCNC: 3.7 G/DL (ref 2–4)
GLUCOSE SERPL-MCNC: 232 MG/DL (ref 74–100)
GLUCOSE SERPL-MCNC: 259 MG/DL (ref 70–105)
GLUCOSE UR STRIP-MCNC: 200 MG/DL
HCT VFR BLD AUTO: 41.2 % (ref 38–47)
HGB BLD-MCNC: 13 G/DL (ref 12–16)
IMM GRANULOCYTES # BLD AUTO: 0.03 K/UL (ref 0–0.04)
IMM GRANULOCYTES NFR BLD: 0.3 % (ref 0–0.4)
KETONES UR STRIP-SCNC: NEGATIVE MG/DL
LEUKOCYTE ESTERASE UR QL STRIP: NEGATIVE
LYMPHOCYTES # BLD AUTO: 2.47 K/UL (ref 1–4.8)
LYMPHOCYTES NFR BLD AUTO: 22.5 % (ref 27–41)
MCH RBC QN AUTO: 28.3 PG (ref 27–31)
MCHC RBC AUTO-ENTMCNC: 31.6 G/DL (ref 32–36)
MCV RBC AUTO: 89.6 FL (ref 80–96)
MONOCYTES # BLD AUTO: 0.57 K/UL (ref 0–0.8)
MONOCYTES NFR BLD AUTO: 5.2 % (ref 2–6)
MPC BLD CALC-MCNC: 11.1 FL (ref 9.4–12.4)
NEUTROPHILS # BLD AUTO: 7.79 K/UL (ref 1.8–7.7)
NEUTROPHILS NFR BLD AUTO: 70.8 % (ref 53–65)
NITRITE UR QL STRIP: NEGATIVE
NRBC # BLD AUTO: 0 X10E3/UL
NRBC, AUTO (.00): 0 %
PH UR STRIP: 6 PH UNITS
PLATELET # BLD AUTO: 278 K/UL (ref 150–400)
POTASSIUM SERPL-SCNC: 4.1 MMOL/L (ref 3.5–5.1)
PROT SERPL-MCNC: 7.5 G/DL (ref 6.4–8.3)
PROT UR QL STRIP: NEGATIVE
RBC # BLD AUTO: 4.6 M/UL (ref 4.2–5.4)
RBC # UR STRIP: NEGATIVE /UL
SODIUM SERPL-SCNC: 139 MMOL/L (ref 136–145)
SP GR UR STRIP: 1
UROBILINOGEN UR STRIP-ACNC: NORMAL MG/DL
WBC # BLD AUTO: 10.99 K/UL (ref 4.5–11)

## 2025-03-15 PROCEDURE — 63600175 PHARM REV CODE 636 W HCPCS

## 2025-03-15 PROCEDURE — 25000003 PHARM REV CODE 250

## 2025-03-15 PROCEDURE — 96375 TX/PRO/DX INJ NEW DRUG ADDON: CPT

## 2025-03-15 PROCEDURE — 80053 COMPREHEN METABOLIC PANEL: CPT

## 2025-03-15 PROCEDURE — 96365 THER/PROPH/DIAG IV INF INIT: CPT

## 2025-03-15 PROCEDURE — 36415 COLL VENOUS BLD VENIPUNCTURE: CPT

## 2025-03-15 PROCEDURE — 85025 COMPLETE CBC W/AUTO DIFF WBC: CPT

## 2025-03-15 PROCEDURE — 99284 EMERGENCY DEPT VISIT MOD MDM: CPT | Mod: 25

## 2025-03-15 PROCEDURE — 81003 URINALYSIS AUTO W/O SCOPE: CPT

## 2025-03-15 PROCEDURE — 82962 GLUCOSE BLOOD TEST: CPT

## 2025-03-15 RX ORDER — ONDANSETRON 4 MG/1
4 TABLET, FILM COATED ORAL EVERY 6 HOURS
Qty: 56 TABLET | Refills: 0 | Status: SHIPPED | OUTPATIENT
Start: 2025-03-15 | End: 2025-03-29

## 2025-03-15 RX ORDER — ACETAMINOPHEN AND CODEINE PHOSPHATE 300; 30 MG/1; MG/1
1 TABLET ORAL EVERY 12 HOURS PRN
COMMUNITY
Start: 2025-03-11

## 2025-03-15 RX ORDER — MORPHINE SULFATE 4 MG/ML
4 INJECTION, SOLUTION INTRAMUSCULAR; INTRAVENOUS
Refills: 0 | Status: COMPLETED | OUTPATIENT
Start: 2025-03-15 | End: 2025-03-15

## 2025-03-15 RX ADMIN — PROMETHAZINE HYDROCHLORIDE 25 MG: 25 INJECTION INTRAMUSCULAR; INTRAVENOUS at 11:03

## 2025-03-15 RX ADMIN — SODIUM CHLORIDE 1000 ML: 9 INJECTION, SOLUTION INTRAVENOUS at 11:03

## 2025-03-15 RX ADMIN — MORPHINE SULFATE 4 MG: 4 INJECTION INTRAVENOUS at 11:03

## 2025-03-15 NOTE — DISCHARGE INSTRUCTIONS
Take zofran as ordered for nausea and vomiting. Drink plenty of fluids. Follow up with primary care provider next week for re-evaluation. Return to the emergency department for new or worsening symptoms.

## 2025-03-15 NOTE — ED PROVIDER NOTES
Encounter Date: 3/15/2025       History     Chief Complaint   Patient presents with    Abdominal Pain    Vomiting     X 3 days      50-year old female presents to the emergency department for evaluation of abdominal pain, nausea, vomiting, and diarrhea that has been going on for the past three days. Reports that she has not been able to eat in three days and began having increased lower back and lower extremity pain. Patient has a history of chronic lumbar radiculopathy. Denies fever, chills, dysuria, chest pain, shortness of breath weakness.    The history is provided by the patient. No  was used.   Abdominal Pain  The other symptoms of the illness include nausea, vomiting and diarrhea. The other symptoms of the illness do not include fever, shortness of breath or dysuria.   Symptoms associated with the illness do not include chills, constipation or frequency.   Emesis   This is a new problem. The current episode started several days ago. The problem occurs 2 - 4 times per day. The problem has been unchanged. The emesis has an appearance of stomach contents. Associated symptoms include abdominal pain and diarrhea. Pertinent negatives include no chills, no cough, no fever, no headaches and no URI.     Review of patient's allergies indicates:   Allergen Reactions    Tramadol Rash     Past Medical History:   Diagnosis Date    Asthma     Diabetes mellitus     Gout, unspecified     Thyroid disease      Past Surgical History:   Procedure Laterality Date    EPIDURAL STEROID INJECTION N/A 12/5/2024    Procedure: Injection, Steroid, Epidural, L4/5;  Surgeon: Penny Scott MD;  Location: [facility];  Service: Pain Management;  Laterality: N/A;    FINGER AMPUTATION Left     PARTIAL INDEX FINGER    HYSTERECTOMY      PARTIAL    OPEN REDUCTION AND INTERNAL FIXATION (ORIF) OF INJURY OF ANKLE Right      Family History   Family history unknown: Yes     Social History[1]  Review of Systems    Constitutional:  Positive for appetite change. Negative for activity change, chills and fever.   Eyes:  Negative for photophobia and pain.   Respiratory:  Negative for apnea, cough, shortness of breath and wheezing.    Cardiovascular:  Negative for chest pain, palpitations and leg swelling.   Gastrointestinal:  Positive for abdominal pain, diarrhea, nausea and vomiting. Negative for abdominal distention and constipation.   Genitourinary:  Negative for decreased urine volume, difficulty urinating, dysuria and frequency.   Musculoskeletal:  Negative for neck pain and neck stiffness.   Neurological:  Negative for dizziness, tremors, weakness, light-headedness and headaches.   Psychiatric/Behavioral:  Negative for confusion.    All other systems reviewed and are negative.      Physical Exam     Initial Vitals [03/15/25 1045]   BP Pulse Resp Temp SpO2   132/72 100 18 98.5 °F (36.9 °C) 97 %      MAP       --         Physical Exam    Vitals reviewed.  Constitutional: She appears well-nourished. No distress.   HENT:   Head: Normocephalic.   Eyes: Conjunctivae are normal. Right eye exhibits no discharge. Left eye exhibits no discharge.   Neck: Neck supple.   Normal range of motion.  Cardiovascular:  Normal rate, regular rhythm and normal heart sounds.           Pulmonary/Chest: Breath sounds normal. No respiratory distress. She has no wheezes. She has no rhonchi. She exhibits no tenderness.   Abdominal: Abdomen is soft and protuberant. Bowel sounds are normal. She exhibits no distension. There is generalized abdominal tenderness.   No right CVA tenderness.  No left CVA tenderness. There is no guarding and negative Joshi's sign.   Musculoskeletal:         General: No tenderness. Normal range of motion.      Cervical back: Normal range of motion and neck supple.     Lymphadenopathy:     She has no cervical adenopathy.   Neurological: She is alert and oriented to person, place, and time. She has normal strength. No sensory  deficit. GCS score is 15. GCS eye subscore is 4. GCS verbal subscore is 5. GCS motor subscore is 6.   Skin: Skin is warm and dry. Capillary refill takes less than 2 seconds.   Psychiatric: She has a normal mood and affect. Her behavior is normal.         Medical Screening Exam   See Full Note    ED Course   Procedures  Labs Reviewed   COMPREHENSIVE METABOLIC PANEL - Abnormal       Result Value    Sodium 139      Potassium 4.1      Chloride 101      CO2 25      Anion Gap 17 (*)     Glucose 232 (*)     BUN 8 (*)     Creatinine 0.95      BUN/Creatinine Ratio 8      Calcium 10.0      Total Protein 7.5      Albumin 3.8      Globulin 3.7      A/G Ratio 1.0      Bilirubin, Total 0.7      Alk Phos 81      ALT 22      AST 38      eGFR 72     URINALYSIS, REFLEX TO URINE CULTURE - Abnormal    Color, UA Colorless      Clarity, UA Clear      pH, UA 6.0      Leukocytes, UA Negative      Nitrites, UA Negative      Protein, UA Negative      Glucose,  (*)     Ketones, UA Negative      Urobilinogen, UA Normal      Bilirubin, UA Negative      Blood, UA Negative      Specific Gravity, UA 1.004     CBC WITH DIFFERENTIAL - Abnormal    WBC 10.99      RBC 4.60      Hemoglobin 13.0      Hematocrit 41.2      MCV 89.6      MCH 28.3      MCHC 31.6 (*)     RDW 13.1      Platelet Count 278      MPV 11.1      Neutrophils % 70.8 (*)     Lymphocytes % 22.5 (*)     Monocytes % 5.2      Eosinophils % 0.7 (*)     Basophils % 0.5      Immature Granulocytes % 0.3      nRBC, Auto 0.0      Neutrophils, Abs 7.79 (*)     Lymphocytes, Absolute 2.47      Monocytes, Absolute 0.57      Eosinophils, Absolute 0.08      Basophils, Absolute 0.05      Immature Granulocytes, Absolute 0.03      nRBC, Absolute 0.00      Diff Type Auto     POCT GLUCOSE MONITORING CONTINUOUS - Abnormal    POC Glucose 259 (*)    CBC W/ AUTO DIFFERENTIAL    Narrative:     The following orders were created for panel order CBC auto differential.  Procedure                                Abnormality         Status                     ---------                               -----------         ------                     CBC with Differential[[id number]]       Abnormal            Final result                 Please view results for these tests on the individual orders.   POCT GLUCOSE MONITORING CONTINUOUS          Imaging Results              X-Ray Abdomen Flat And Erect (Final result)  Result time 03/15/25 13:08:16      Final result by Ibrahima Woodson MD (03/15/25 13:08:16)                   Impression:      Nonobstructive bowel gas pattern.      Electronically signed by: Ibrahima Woodson MD  Date:    03/15/2025  Time:    13:08               Narrative:    EXAMINATION:  XR ABDOMEN FLAT AND ERECT    CLINICAL HISTORY:  Unspecified abdominal pain.    TECHNIQUE:  Flat and erect frontal views of the abdomen were performed.    COMPARISON:  06/02/2019.    FINDINGS:  Bowel gas pattern is nonobstructive.  No evidence of pneumoperitoneum.  No large volume fecal burden.  No pathologic calcifications.  Surgical clips overlie the upper abdomen likely from prior cholecystectomy.  Degenerative changes in the spine and hips.                                       Medications   sodium chloride 0.9% bolus 1,000 mL 1,000 mL (1,000 mLs Intravenous New Bag 3/15/25 1141)   promethazine (PHENERGAN) 25 mg in 0.9% NaCl 50 mL IVPB (0 mg Intravenous Stopped 3/15/25 1231)   morphine injection 4 mg (4 mg Intravenous Given 3/15/25 1138)     Medical Decision Making  50-year old female presents to the emergency department for evaluation of abdominal pain, nausea, vomiting, and diarrhea that has been going on for the past three days. Reports that she has not been able to eat in three days and began having increased lower back and lower extremity pain. Patient has a history of chronic lumbar radiculopathy. Denies fever, chills, dysuria, chest pain, shortness of breath weakness.  Labs, urinalysis ordered and reviewed  1L NS,  morphine, phenergan IV administered in ED  Prescription provided  Diagnosis: Gastroenteritis    Amount and/or Complexity of Data Reviewed  Labs: ordered.  Radiology: ordered.    Risk  Prescription drug management.                                      Clinical Impression:   Final diagnoses:  [R10.9] Abdominal pain  [K52.9] Gastroenteritis (Primary)        ED Disposition Condition    Discharge Stable          ED Prescriptions       Medication Sig Dispense Start Date End Date Auth. Provider    ondansetron (ZOFRAN) 4 MG tablet Take 1 tablet (4 mg total) by mouth every 6 (six) hours. for 14 days 56 tablet 3/15/2025 3/29/2025 Sandeep Orellana NP          Follow-up Information    None              [1]   Social History  Tobacco Use    Smoking status: Former     Current packs/day: 0.00     Types: Cigarettes     Quit date:      Years since quittin.2    Smokeless tobacco: Never   Substance Use Topics    Alcohol use: Never    Drug use: Never        Sandeep Orellana, NP  03/15/25 [id number]

## 2025-03-19 ENCOUNTER — TELEPHONE (OUTPATIENT)
Dept: FAMILY MEDICINE | Facility: CLINIC | Age: 50
End: 2025-03-19
Payer: COMMERCIAL

## 2025-03-20 DIAGNOSIS — E11.9 DIABETES MELLITUS TYPE II, NON INSULIN DEPENDENT: ICD-10-CM

## 2025-03-20 RX ORDER — METFORMIN HYDROCHLORIDE 1000 MG/1
1000 TABLET ORAL 2 TIMES DAILY WITH MEALS
COMMUNITY
End: 2025-03-20 | Stop reason: SDUPTHER

## 2025-03-20 RX ORDER — METFORMIN HYDROCHLORIDE 1000 MG/1
1000 TABLET ORAL 2 TIMES DAILY WITH MEALS
Qty: 180 TABLET | Refills: 1 | Status: SHIPPED | OUTPATIENT
Start: 2025-03-20

## 2025-03-20 NOTE — TELEPHONE ENCOUNTER
----- Message from Cristel sent at 3/20/2025  2:00 PM CDT -----  Patient called requesting again to speak with a nurse about her medications

## 2025-03-21 ENCOUNTER — OFFICE VISIT (OUTPATIENT)
Dept: ORTHOPEDICS | Facility: CLINIC | Age: 50
End: 2025-03-21
Payer: COMMERCIAL

## 2025-03-21 VITALS — HEIGHT: 69 IN | BODY MASS INDEX: 43.4 KG/M2 | WEIGHT: 293 LBS

## 2025-03-21 DIAGNOSIS — M17.12 OSTEOARTHRITIS OF LEFT KNEE, UNSPECIFIED OSTEOARTHRITIS TYPE: ICD-10-CM

## 2025-03-21 PROCEDURE — 20610 DRAIN/INJ JOINT/BURSA W/O US: CPT | Mod: PBBFAC,LT

## 2025-03-21 PROCEDURE — 99214 OFFICE O/P EST MOD 30 MIN: CPT | Mod: PBBFAC

## 2025-03-21 PROCEDURE — 99999PBSHW PR PBB SHADOW TECHNICAL ONLY FILED TO HB: Mod: PBBFAC,,,

## 2025-03-21 PROCEDURE — 99999 PR PBB SHADOW E&M-EST. PATIENT-LVL IV: CPT | Mod: PBBFAC,,,

## 2025-03-21 RX ADMIN — TRIAMCINOLONE ACETONIDE 40 MG: 40 INJECTION, SUSPENSION INTRA-ARTICULAR; INTRAMUSCULAR at 11:03

## 2025-03-21 RX ADMIN — BUPIVACAINE HYDROCHLORIDE 1 ML: 2.5 INJECTION, SOLUTION EPIDURAL; INFILTRATION; INTRACAUDAL; PERINEURAL at 11:03

## 2025-03-25 RX ORDER — TRIAMCINOLONE ACETONIDE 40 MG/ML
40 INJECTION, SUSPENSION INTRA-ARTICULAR; INTRAMUSCULAR
Status: DISCONTINUED | OUTPATIENT
Start: 2025-03-21 | End: 2025-03-25 | Stop reason: HOSPADM

## 2025-03-25 RX ORDER — BUPIVACAINE HYDROCHLORIDE 2.5 MG/ML
1 INJECTION, SOLUTION EPIDURAL; INFILTRATION; INTRACAUDAL; PERINEURAL
Status: DISCONTINUED | OUTPATIENT
Start: 2025-03-21 | End: 2025-03-25 | Stop reason: HOSPADM

## 2025-03-25 NOTE — PROGRESS NOTES
CC:   Chief Complaint   Patient presents with    Left Knee - Pain          Salina Dave is a 50 y.o. female seen today for Pain of the Left Knee  Patient presents today with complaints of left knee pain.  She reports left knee pain beginning in 2022.  She was seen in the family medicine on 03/03/2025 with worsening left knee pain.  No fall or injury to the left knee.  She also has a history of lumbar radiculopathy seen by Dr. Shivam Scott with the pain management.  She reports pain mostly located on the sides of her knee.  She reports pain at night.  She reports knee swelling since the beginning of March.  She is currently taking Indocin and Neurontin for her pain. No other complaints today.      PAST MEDICAL HISTORY:   Past Medical History:   Diagnosis Date    Asthma     Diabetes mellitus     Gout, unspecified     Thyroid disease           PAST SURGICAL HISTORY:   Past Surgical History:   Procedure Laterality Date    EPIDURAL STEROID INJECTION N/A 12/5/2024    Procedure: Injection, Steroid, Epidural, L4/5;  Surgeon: Penny Scott MD;  Location: [facility];  Service: Pain Management;  Laterality: N/A;    FINGER AMPUTATION Left     PARTIAL INDEX FINGER    HYSTERECTOMY      PARTIAL    OPEN REDUCTION AND INTERNAL FIXATION (ORIF) OF INJURY OF ANKLE Right           ALLERGIES:   Review of patient's allergies indicates:   Allergen Reactions    Tramadol Rash        MEDICATIONS :  Current Medications[1]     SOCIAL HISTORY: Social History[2]     FAMILY HISTORY:   Family History   Family history unknown: Yes          PHYSICAL EXAM:      There were no vitals filed for this visit.  Body mass index is 48.57 kg/m².    GENERAL: Well-developed, well-nourished female . The patient is alert, oriented and cooperative.    HEENT:  Normocephalic, atraumatic.  Extraocular movements are intact bilaterally.     NECK:  Nontender with good range of motion.    LUNGS:  Clear to auscultation bilaterally.    HEART:   Regular rate and rhythm.     ABDOMEN:  Soft, non-tender, non-distended.      EXTREMITIES:  Left knee tender to palpation along medial and lateral joint lines, mild soft tissue swelling, no joint effusion appreciated on exam today,e range of motion from 0-120 degrees, mildly positive Jyotsna's testing, knee feels stable to varus and valgus stress testing, neurovascularly intact      RADIOGRAPHIC FINDINGS:   X-Ray Abdomen Flat And Erect  Result Date: 3/15/2025  EXAMINATION: XR ABDOMEN FLAT AND ERECT CLINICAL HISTORY: Unspecified abdominal pain. TECHNIQUE: Flat and erect frontal views of the abdomen were performed. COMPARISON: 06/02/2019. FINDINGS: Bowel gas pattern is nonobstructive.  No evidence of pneumoperitoneum.  No large volume fecal burden.  No pathologic calcifications.  Surgical clips overlie the upper abdomen likely from prior cholecystectomy.  Degenerative changes in the spine and hips.     Nonobstructive bowel gas pattern. Electronically signed by: Ibrahima Woodson MD Date:    03/15/2025 Time:    13:08    X-Ray Knee 1 or 2 View Left  Result Date: 3/3/2025  EXAMINATION: XR KNEE 1 OR 2 VIEW LEFT CLINICAL HISTORY: Pain in left knee TECHNIQUE: Left knee, AP and lateral views COMPARISON: None. FINDINGS: There is osteoarthritis in a tricompartmental distribution.  No effusion or fracture is seen.     Tricompartmental osteoarthritis is present in the left knee. [facility] of [facility] Electronically signed by: Victorina Quezada Date:    03/03/2025 Time:    18:44       Patient Active Problem List    Diagnosis Date Noted    Lumbar radiculopathy, chronic 11/12/2024    Primary osteoarthritis involving multiple joints 11/12/2024    Spondylolisthesis of lumbar region 07/24/2024    Hypothyroidism 11/17/2019    Type 2 diabetes mellitus without complication 11/17/2019     IMPRESSION AND PLAN:  Acute on chronic left knee pain.  No recent fall or injury.  Personally reviewed previous x-rays showing  tricompartmental degenerative changes, no acute fracture or dislocation.  Discussed all treatment options with the patient today.  Recommending referral to physical therapy and possible injection, patient agreed to injection.  See procedural note for details.  Follow up 4-6 weeks.  If she is continuing to have pain after completion of physical therapy we will order MRI at that point.    No follow-ups on file.       Nerissa Ramirez PA-C      (Subject to voice recognition error, transcription service not allowed)           [1]   Current Outpatient Medications:     acetaminophen-codeine 300-30mg (TYLENOL #3) 300-30 mg Tab, Take 1 tablet by mouth every 12 (twelve) hours as needed., Disp: , Rfl:     atorvastatin (LIPITOR) 20 MG tablet, Take 1 tablet (20 mg total) by mouth once daily., Disp: 90 tablet, Rfl: 0    cholecalciferol, vitamin D3, (VITAMIN D3 ORAL), Take 1 each by mouth Daily., Disp: , Rfl:     gabapentin (NEURONTIN) 300 MG capsule, Take 1 capsule (300 mg total) by mouth 3 (three) times daily., Disp: 90 capsule, Rfl: 1    glipiZIDE (GLUCOTROL) 10 MG tablet, Take 1 tablet (10 mg total) by mouth 2 (two) times daily with meals., Disp: 180 tablet, Rfl: 0    hydroCHLOROthiazide 12.5 MG Tab, Take 1 tablet (12.5 mg total) by mouth every morning., Disp: 30 tablet, Rfl: 1    indomethacin (INDOCIN) 50 MG capsule, Take 1 capsule (50 mg total) by mouth 2 (two) times daily with meals., Disp: 60 capsule, Rfl: 1    metFORMIN (GLUCOPHAGE) 1000 MG tablet, Take 1 tablet (1,000 mg total) by mouth 2 (two) times daily with meals., Disp: 180 tablet, Rfl: 1    naloxone (NARCAN) 4 mg/actuation Spry, 1 spray by Nasal route once., Disp: , Rfl:     omega 3-dha-epa-fish oil (FISH OIL) 1,000 (120-180) mg Cap, Take 1 each by mouth once daily., Disp: , Rfl:     ondansetron (ZOFRAN) 4 MG tablet, Take 1 tablet (4 mg total) by mouth every 6 (six) hours. for 14 days, Disp: 56 tablet, Rfl: 0    potassium chloride (KLOR-CON) 10 MEQ TbSR, Take 1  tablet (10 mEq total) by mouth once daily., Disp: 30 tablet, Rfl: 1  [2]   Social History  Socioeconomic History    Marital status: Single   Tobacco Use    Smoking status: Former     Current packs/day: 0.00     Types: Cigarettes     Quit date:      Years since quittin.2    Smokeless tobacco: Never   Substance and Sexual Activity    Alcohol use: Never    Drug use: Never    Sexual activity: Not Currently     Social Drivers of Health     Financial Resource Strain: High Risk (2025)    Overall Financial Resource Strain (CARDIA)     Difficulty of Paying Living Expenses: Very hard   Food Insecurity: Food Insecurity Present (2025)    Hunger Vital Sign     Worried About Running Out of Food in the Last Year: Often true     Ran Out of Food in the Last Year: Often true   Transportation Needs: Unmet Transportation Needs (2025)    PRAPARE - Transportation     Lack of Transportation (Medical): Yes     Lack of Transportation (Non-Medical): Yes   Physical Activity: Inactive (2025)    Exercise Vital Sign     Days of Exercise per Week: 0 days     Minutes of Exercise per Session: 0 min   Stress: Stress Concern Present (2025)    [employer] [place] [employer] - Occupational Stress Questionnaire     Feeling of Stress : To some extent   Housing Stability: High Risk (2025)    Housing Stability Vital Sign     Unable to Pay for Housing in the Last Year: Yes     Number of Times Moved in the Last Year: 0     Homeless in the Last Year: No

## 2025-03-26 NOTE — PROGRESS NOTES
JESUS Pappas   [facility] [address] [place] [address]  [phone]      PATIENT NAME: Salina Dave  : 1975  DATE: 3/3/25  MRN: [id number]      Billing Provider: JESUS Pappas  Level of Service:   Patient PCP Information       Provider PCP Type    Primary Doctor No General            History of Present Illness    CHIEF COMPLAINT:  Patient presents today for follow up and to get lab work.    DIABETES:  She has type 2 diabetes mellitus with last reported A1C in 7 range. She is not currently monitoring glucose at home due to lack of testing supplies after using initial 10 test strips provided with glucometer.    CURRENT MEDICATIONS:  She takes gabapentin prescribed by bone and spine specialist, indocin twice daily for gout, hydrochlorothiazide for blood pressure management, and potassium supplements for low potassium. She also takes OTC vitamin D supplements.    MEDICAL HISTORY:  She has a history of chronic vitamin D deficiency spanning several years, low thyroid function (previously medicated, currently not taking thyroid medication), and gout.    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 The initial injury caused soreness and swelling without fracture or dislocation. She reports recent worsening of symptoms with significant difficulty bending the knee.      ROS:  General: -fever, -chills, -fatigue, -weight gain, -weight loss  Eyes: -vision changes, -redness, -discharge  ENT: -ear pain, -nasal congestion, -sore throat  Cardiovascular: -chest pain, -palpitations, -lower extremity edema  Respiratory: -cough, -shortness of breath  Gastrointestinal: -abdominal pain, -nausea, -vomiting, -diarrhea, -constipation, -blood in stool  Genitourinary: -dysuria, -hematuria, -frequency  Musculoskeletal: +joint pain, -muscle pain  Skin: -rash,  -lesion  Neurological: -headache, -dizziness, -numbness, -tingling  Psychiatric: -anxiety, -depression, -sleep difficulty          Medications and Allergies     Medications  Outpatient Medications Marked as Taking for the 3/3/25 encounter (Office Visit) with Tierra Alvarado FNP   Medication Sig Dispense Refill    cholecalciferol, vitamin D3, (VITAMIN D3 ORAL) Take 1 each by mouth Daily.      naloxone (NARCAN) 4 mg/actuation Spry 1 spray by Nasal route once.      omega 3-dha-epa-fish oil (FISH OIL) 1,000 (120-180) mg Cap Take 1 each by mouth once daily.      [DISCONTINUED] gabapentin (NEURONTIN) 300 MG capsule Take 1 capsule (300 mg total) by mouth 3 (three) times daily. 90 capsule 5    [DISCONTINUED] glipiZIDE (GLUCOTROL) 10 MG tablet Take 10 mg by mouth daily with breakfast.      [DISCONTINUED] hydroCHLOROthiazide (HYDRODIURIL) 12.5 MG Tab Take 12.5 mg by mouth every morning.      [DISCONTINUED] INDOMETHACIN ORAL Take by mouth. TAKE 2 CAPS DAILY  PT DOESN'T KNOW DOSE      [DISCONTINUED] metFORMIN (GLUCOPHAGE) 500 MG tablet Take 1,000 mg by mouth 2 (two) times daily with meals.      [DISCONTINUED] potassium chloride (KLOR-CON) 10 MEQ TbSR Take 10 mEq by mouth once daily.         Allergies  Review of patient's allergies indicates:   Allergen Reactions    Tramadol Rash       Physical Exam    Vitals: Blood pressure: 144/82.  General: No acute distress. Morbid obesity.   Eyes: EOMI. Sclerae anicteric.  HENT: Normocephalic. Atraumatic.   Cardiovascular: Regular rate. Regular rhythm.   Respiratory: Normal respiratory effort. Clear to auscultation bilaterally.   Abdomen: Soft. Non-tender. Non-distended. Normoactive bowel sounds.  Musculoskeletal: No  obvious deformity.  Extremities: No lower extremity edema.  Neurological: Alert & oriented x3. No slurred speech. Normal gait.  Psychiatric: Normal mood. Normal affect.  Skin: Warm. Dry. No rash.          Assessment & Plan    IMPRESSION:  - Assessed blood pressure: 144/82, not  optimal but acceptable  - Evaluated vitamin D status: historically low, currently on OTC supplement  - Investigated knee pain: considering x-ray to rule out bone issues, assess for arthritis or gout-related changes  - Reviewed thyroid function: history of low thyroid, ordering TSH to reassess current status  - Monitored gout: checking uric acid levels to guide management  - Evaluated diabetes control: last A1C was 7+, not on insulin, planning to reassess    DIABETES:  - Ordered Hemoglobin A1C test to assess glycemic control.  - Noted that the patient's last A1C was approximately 7.  - Planned to evaluate the need for more frequent follow-ups based on A1C results, possibly in 1 month if elevated.    THYROID DISORDER:  - Ordered thyroid function tests: TSH and T4.  - Noted that the patient has a history of hypothyroidism but is not currently taking thyroid medication.    VITAMIN D DEFICIENCY:  - Recommend continuation of OTC vitamin D supplements.  - Ordered vitamin D level test to assess the effectiveness of current supplementation.    HYPOKALEMIA:  - Advised continuation of potassium supplements for management of hypokalemia.    HYPERTENSION:  - Continued hydrochlorothiazide for blood pressure management.  - Noted current blood pressure of 144/82, which is suboptimal but acceptable.    GOUT:  - Continued indocin 50 mg, 1 tablet every 12 hours for gout management.  - Ordered uric acid level test.  - Discussed with patient the potential impact of gout medication on kidney function.    CHRONIC PAIN:  - Continued gabapentin for pain management.    KNEE PAIN:  - Ordered knee x-ray to assess the condition, rule out bone issues, and check for arthritis.  - Noted that the patient reports worsening knee pain with difficulty bending, and has a history of knee injury from 16 years ago.  - She has expected orthopedic appointment scheduled.       MEDICATIONS/SUPPLEMENTS:  - Planned to refill medications for 1 month, with  potential for 90-day refills if lab results are satisfactory.      FOLLOW UP:  - Scheduled a follow up in 6 months or sooner depending on lab results.         Health Maintenance Due   Topic Date Due    Mammogram  Never done    TETANUS VACCINE  01/12/2017    Colorectal Cancer Screening  Never done    Shingles Vaccine (1 of 2) Never done    Pneumococcal Vaccines (Age 50+) (1 of 1 - PCV) Never done    Influenza Vaccine (1) Never done    COVID-19 Vaccine (3 - 2024-25 season) 09/01/2024       Problem List Items Addressed This Visit          Neuro    Lumbar radiculopathy, chronic (Chronic)    Relevant Medications    gabapentin (NEURONTIN) 300 MG capsule     Other Visit Diagnoses         Diabetes mellitus type II, non insulin dependent    -  Primary      Left knee pain, unspecified chronicity        Relevant Orders    X-Ray Knee 1 or 2 View Left (Completed)      Screening for diabetes mellitus (DM)        Relevant Orders    Comprehensive Metabolic Panel (Completed)    Hemoglobin A1C (Completed)      Encounter for screening for lipid disorder        Relevant Orders    Comprehensive Metabolic Panel (Completed)    Lipid Panel (Completed)      Obesities, morbid        Relevant Orders    CBC Auto Differential (Completed)    Comprehensive Metabolic Panel (Completed)      Vitamin D deficiency disease        Relevant Orders    Vitamin D (Completed)      Screening for HIV (human immunodeficiency virus)        Relevant Orders    HIV 1/2 Ag/Ab (4th Gen) (Completed)      Encounter for hepatitis C screening test for low risk patient        Relevant Orders    Hepatitis C Antibody (Completed)      History of hypothyroidism        Relevant Orders    T4, Free (Completed)    TSH (Completed)      History of gout        Relevant Medications    indomethacin (INDOCIN) 50 MG capsule    Other Relevant Orders    Uric Acid (Completed)      Hypertension, unspecified type        Relevant Medications    hydroCHLOROthiazide 12.5 MG Tab      Hypokalemia         Relevant Medications    potassium chloride (KLOR-CON) 10 MEQ TbSR            Health Maintenance Topics with due status: Not Due       Topic Last Completion Date    Lipid Panel 03/03/2025    RSV Vaccine (Age 60+ and Pregnant patients) Not Due       Future Appointments   Date Time Provider Department Center   3/28/2025  1:15 PM [facility]   5/2/2025 11:20 AM Nerissa Ramirez PA [facility]   9/3/2025  1:40 PM [facility]        This note was generated with the assistance of ambient listening technology. Verbal consent was obtained by the patient and accompanying visitor(s) for the recording of patient appointment to facilitate this note. I attest to having reviewed and edited the generated note for accuracy, though some syntax or spelling errors may persist. Please contact the author of this note for any clarification.     Signature:  JESUS Pappas [facility] [address] [place] [address]  [phone]    Date of encounter: 3/3/25

## 2025-03-28 ENCOUNTER — CLINICAL SUPPORT (OUTPATIENT)
Dept: REHABILITATION | Facility: HOSPITAL | Age: 50
End: 2025-03-28
Payer: COMMERCIAL

## 2025-03-28 DIAGNOSIS — M17.12 OSTEOARTHRITIS OF LEFT KNEE, UNSPECIFIED OSTEOARTHRITIS TYPE: ICD-10-CM

## 2025-03-28 DIAGNOSIS — G89.29 CHRONIC KNEE PAIN AFTER TOTAL REPLACEMENT OF LEFT KNEE JOINT: ICD-10-CM

## 2025-03-28 DIAGNOSIS — M25.662 DECREASED ROM OF LEFT KNEE: Primary | ICD-10-CM

## 2025-03-28 DIAGNOSIS — Z96.652 CHRONIC KNEE PAIN AFTER TOTAL REPLACEMENT OF LEFT KNEE JOINT: ICD-10-CM

## 2025-03-28 DIAGNOSIS — M25.562 CHRONIC KNEE PAIN AFTER TOTAL REPLACEMENT OF LEFT KNEE JOINT: ICD-10-CM

## 2025-03-28 PROCEDURE — 97110 THERAPEUTIC EXERCISES: CPT | Mod: PN

## 2025-03-28 PROCEDURE — 97161 PT EVAL LOW COMPLEX 20 MIN: CPT | Mod: PN

## 2025-03-28 NOTE — PROGRESS NOTES
Outpatient Rehab    Physical Therapy Evaluation    Patient Name: Salina Dave  MRN: [id number]  YOB: 1975  Encounter Date: 3/28/2025    Therapy Diagnosis:   Encounter Diagnoses   Name Primary?    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   FOTO report - see Media section or FOTO account episode details.     Intake Score:  %         Subjective   History of Present Illness  Salina is a 50 y.o. female who reports to physical therapy with a chief concern of left knee pain , locked up wont move, severe pain in  bending.     The patient reports a medical diagnosis of left osteoathritis. The patient has experienced this issue since 03/28/25.   Diagnostic tests related to this condition: X-ray.        Dominant Hand: Right  History of Present Condition/Illness: Pt voices she has been having severe knee pain in her left knee for about 3 months . Pt voices she has gotten to where she can't bend it .  Pt voices her pain is severe and can't move her knee enough to drive. Pt voices she has severe pain constantly and especially with bending .     Pain     Patient reports a current pain level of 10/10. Pain at best is reported as 9/10. Pain at worst is reported as 10/10.   Clinical Progression (since onset): Worsening  Pain Qualities: Aching, Discomfort, Knife-like, Sharp, Pulling,  Tenderness, Tightness, Dull, Burning  Pain-Relieving Factors: Activity modification, Rest  Pain-Aggravating Factors: Standing, Exercise, Bending, Twisting, Kneeling, Squatting         Treatment History  Treatments  Previously Received Treatments: No  Currently Receiving Treatments: No    Living Arrangements  Living Situation  Housing: Home independently  Living Arrangements: Family members          Past Medical History/Physical Systems Review:   Salina Dave  has a past medical history of Asthma, Diabetes mellitus, Gout, unspecified, and Thyroid disease.    Salina Dave  has a past surgical history that includes Hysterectomy; Finger amputation (Left); Open reduction and internal fixation (ORIF) of injury of ankle (Right); and Epidural steroid injection (N/A, 12/5/2024).    Salina has a current medication list which includes the following prescription(s): acetaminophen-codeine 300-30mg, atorvastatin, cholecalciferol (vitamin d3), gabapentin, glipizide, hydrochlorothiazide, indomethacin, metformin, naloxone, omega 3-dha-epa-fish oil, ondansetron, and potassium chloride.    Review of patient's allergies indicates:   Allergen Reactions    Tramadol Rash        Objective      Knee Palpation     Patella femoral pain ,  meniscus symptoms                  Knee Range of Motion   Right Knee   Active (deg) Passive (deg) Pain   Flexion 120       Extension 0           Left Knee   Active (deg) Passive (deg) Pain   Flexion 34 38     Extension -10           Quad lag right knee 0   left knee -22               Hip Strength - Planes of Motion   Right Strength Right Pain Left Strength Left  Pain   Flexion (L2) 4   4     Extension 4   4     ABduction 4   4     ADduction 4   4     Internal Rotation 4   4     External Rotation 4   4         Knee Strength   Right Strength Right Pain Left Strength Left  Pain   Flexion (S2) 5   3     Prone Flexion 5   3     Extension (L3) 5   3                   Treatment:  Therapeutic Exercise  TE 1: pt  given home ex program quad sets and tke      Time Entry(in minutes):       Assessment & Plan   Assessment  Salina presents with a condition of Low complexity.   Presentation of Symptoms: Evolving  Will Comorbidities Impact Care: Yes  Pt has high pain tolerance and has limited left knee range of motion unable to perform therapy secondary to pain and range of motion limitations, pt needs mri to confirm mensicus tear causing knee to lock up ?    Functional Limitations: Activity tolerance, Proprioception, Range of motion, Squatting, Sitting tolerance, Pain when reaching, Driving, Disrupted sleep pattern, Decreased ambulation distance/endurance, Completing self-care activities  Impairments: Activity intolerance, Pain with functional activity, Impaired physical strength  Personal Factors Affecting Prognosis: Pain    Patient Goal for Therapy (PT): be george to use left knee and bend it again  Prognosis: Poor  Prognosis Details: Pt has limited knee range of motion 0-34 degrees arom ,  quad lag -22 degrees . Poor rehab . Pt likely needs surgical intervention with major meniscus tear .    Assessment Details: Pt has limited knee range of motion 0-34 degrees arom ,  quad lag -22 degrees . Poor rehab . Pt likely needs surgical intervention with major meniscus tear symptoms .  Pt tested positive for qamar and thessaly for mensicus tear.  Pt would benefit from mri . Pt knee  is unable to tolerate therapy other than isometics and range of motion 0-30 degrees at this time. PT WILL BENEFIT FROM MRI AND ORTHO CONSULT AFTER MRI  TO CONFIRM KNEE DAMAGE     Plan  From a physical therapy perspective, the patient would benefit from: Skilled Rehab Services    Planned therapy interventions include: Therapeutic exercise, Therapeutic activities, and Neuromuscular re-education.            Visit Frequency: 1 times In Total          This plan was discussed with Patient.   Discussion participants: Agreed Upon Plan of Care  Plan details: Plan Plan  of care Certification: 3/28/2025 to 4/1/2025 . Outpatient Physical Therapy 1 times weekly for 1 weeks to include the following interventions: Manual Therapy, Neuromuscular Re-ed, Patient Education, and Therapeutic Exercise.  Plan of care has been reestablished with Riya BALL and Devorah BALL    Pt need mri to confirm mensicus tear symptoms .            Patient's spiritual, cultural, and educational needs considered and patient agreeable to plan of care and goals.     Education  Education was done with Patient. The patient's learning style includes Demonstration and Listening. The patient Verbalizes understanding.                 Goals:   Active       Short term goals        pt will be independent with home ex program        Start:  03/28/25    Expected End:  04/04/25                This plan was discussed with Patient.   Discussion participants: Agreed Upon Plan of Care  Plan details: Plan Plan of care Certification: 3/28/2025 to 4/1/2025 . Outpatient Physical Therapy 1 times weekly for 1 weeks to include the following interventions: Manual Therapy, Neuromuscular Re-ed, Patient Education, and Therapeutic Exercise.  Plan of care has been reestablished with Riya BALL and Devorah BALL    Pt need mri to confirm mensicus tear symptoms .      Will d/c at this time until pt can have mri / surgical intervention to tolerate therapy   Anand Ward, PT

## 2025-03-31 ENCOUNTER — TELEPHONE (OUTPATIENT)
Dept: ORTHOPEDICS | Facility: CLINIC | Age: 50
End: 2025-03-31
Payer: COMMERCIAL

## 2025-03-31 NOTE — TELEPHONE ENCOUNTER
Spoken with patient and I tried to schedule her an appt to see PITO Dias. Patient stated that at this time she would not be able to come in and be seen she stated that she would have to wait until her sister can bring her. I told her when she is ready for that appt to call and schedule it with Nerissa Ramirez for MRI of left knee. Patient understood and verbalized understanding.        Roberto Lilly CMA

## 2025-04-01 ENCOUNTER — TELEPHONE (OUTPATIENT)
Dept: ORTHOPEDICS | Facility: CLINIC | Age: 50
End: 2025-04-01
Payer: COMMERCIAL

## 2025-04-01 ENCOUNTER — PATIENT MESSAGE (OUTPATIENT)
Dept: ORTHOPEDICS | Facility: CLINIC | Age: 50
End: 2025-04-01

## 2025-04-01 DIAGNOSIS — M25.562 ACUTE PAIN OF LEFT KNEE: ICD-10-CM

## 2025-04-01 DIAGNOSIS — M17.12 OSTEOARTHRITIS OF LEFT KNEE, UNSPECIFIED OSTEOARTHRITIS TYPE: Primary | ICD-10-CM

## 2025-04-01 NOTE — TELEPHONE ENCOUNTER
Attempted to contact patient to let her know the time and date of her Mri appointment but no answer. If patient calls back please let her know that she is scheduled for April 15,2025 at 2:30 pm here at [facility].    Thank you,  Roberto Lilly CMA

## 2025-04-02 ENCOUNTER — TELEPHONE (OUTPATIENT)
Dept: ORTHOPEDICS | Facility: CLINIC | Age: 50
End: 2025-04-02
Payer: COMMERCIAL

## 2025-04-02 NOTE — TELEPHONE ENCOUNTER
Spoken with patient and she's not scheduled for April 25,2025 at 10:00 am. Patient understood and verbalized understanding.      Roberto Lilly,CMA

## 2025-04-02 NOTE — TELEPHONE ENCOUNTER
----- Message from Edmond sent at 4/1/2025  4:43 PM CDT -----  Regarding: MRI  Who Called: Salina Kelsey called back and let us know that April 15 does not work for her she says the MRI has to be on a fridayPreferred Method of Contact: Phone CallPatient's Preferred Phone Number on File: [phone] Best Call Back Number, if different:Additional Information:

## 2025-04-25 ENCOUNTER — HOSPITAL ENCOUNTER (EMERGENCY)
Facility: HOSPITAL | Age: 50
Discharge: HOME | End: 2025-04-25

## 2025-04-25 VITALS
HEIGHT: 69 IN | RESPIRATION RATE: 20 BRPM | WEIGHT: 293 LBS | SYSTOLIC BLOOD PRESSURE: 144 MMHG | TEMPERATURE: 99 F | DIASTOLIC BLOOD PRESSURE: 87 MMHG | HEART RATE: 94 BPM | BODY MASS INDEX: 43.4 KG/M2 | OXYGEN SATURATION: 97 %

## 2025-04-25 DIAGNOSIS — M25.562 LEFT KNEE PAIN, UNSPECIFIED CHRONICITY: Primary | ICD-10-CM

## 2025-04-25 PROCEDURE — 99284 EMERGENCY DEPT VISIT MOD MDM: CPT | Mod: 25

## 2025-04-25 PROCEDURE — 96372 THER/PROPH/DIAG INJ SC/IM: CPT | Performed by: NURSE PRACTITIONER

## 2025-04-25 PROCEDURE — 63600175 PHARM REV CODE 636 W HCPCS: Mod: JZ,TB | Performed by: NURSE PRACTITIONER

## 2025-04-25 RX ORDER — ONDANSETRON HYDROCHLORIDE 2 MG/ML
4 INJECTION, SOLUTION INTRAVENOUS
Status: COMPLETED | OUTPATIENT
Start: 2025-04-25 | End: 2025-04-25

## 2025-04-25 RX ORDER — KETOROLAC TROMETHAMINE 30 MG/ML
60 INJECTION, SOLUTION INTRAMUSCULAR; INTRAVENOUS
Status: COMPLETED | OUTPATIENT
Start: 2025-04-25 | End: 2025-04-25

## 2025-04-25 RX ORDER — MORPHINE SULFATE 2 MG/ML
6 INJECTION, SOLUTION INTRAMUSCULAR; INTRAVENOUS
Status: COMPLETED | OUTPATIENT
Start: 2025-04-25 | End: 2025-04-25

## 2025-04-25 RX ADMIN — KETOROLAC TROMETHAMINE 60 MG: 30 INJECTION, SOLUTION INTRAMUSCULAR at 11:04

## 2025-04-25 RX ADMIN — MORPHINE SULFATE 6 MG: 2 INJECTION, SOLUTION INTRAMUSCULAR; INTRAVENOUS at 11:04

## 2025-04-25 RX ADMIN — ONDANSETRON 4 MG: 2 INJECTION INTRAMUSCULAR; INTRAVENOUS at 11:04

## 2025-04-25 NOTE — DISCHARGE INSTRUCTIONS
No weight-bearing.  Ice.  Wear your immobilizer and use your crutches.  Follow up with Orthopedics. Return to the emergency department for any increase in symptoms or for any other new or worrisome symptoms.

## 2025-04-25 NOTE — ED PROVIDER NOTES
Encounter Date: 4/25/2025       History     Chief Complaint   Patient presents with    Knee Pain     PRESENTS TO ER WITH COMPLAINT OF LEFT KNEE PAIN. REPORTS GETTING MRI AND GOT UP AND HEARD A POP. PAIN SINCE      50-year-old female presents to the emergency department to be evaluated for left knee pain.  She has been evaluated by orthopedics for knee pain that began about 3 years ago.  She was scheduled for an MRI this morning, had the MRI done, then stood up and felt a pop in her left knee.  She reports she is not able to been her left knee now in her pain is worse.    The history is provided by the patient.   Knee Pain  This is a chronic problem. Pertinent negatives include no chest pain, no abdominal pain, no headaches and no shortness of breath.     Review of patient's allergies indicates:   Allergen Reactions    Tramadol Rash     Past Medical History:   Diagnosis Date    Asthma     Diabetes mellitus     Gout, unspecified     Thyroid disease      Past Surgical History:   Procedure Laterality Date    EPIDURAL STEROID INJECTION N/A 12/5/2024    Procedure: Injection, Steroid, Epidural, L4/5;  Surgeon: Penny Scott MD;  Location: [facility];  Service: Pain Management;  Laterality: N/A;    FINGER AMPUTATION Left     PARTIAL INDEX FINGER    HYSTERECTOMY      PARTIAL    OPEN REDUCTION AND INTERNAL FIXATION (ORIF) OF INJURY OF ANKLE Right      Family History   Family history unknown: Yes     Social History[1]  Review of Systems   Respiratory:  Negative for shortness of breath.    Cardiovascular:  Negative for chest pain.   Gastrointestinal:  Negative for abdominal pain.   Neurological:  Negative for headaches.   All other systems reviewed and are negative.      Physical Exam     Initial Vitals [04/25/25 1038]   BP Pulse Resp Temp SpO2   (!) 144/87 94 18 98.6 °F (37 °C) 97 %      MAP       --         Physical Exam    Vitals reviewed.  Constitutional: She appears well-developed and well-nourished.   Neck:  Neck supple.   Cardiovascular:  Normal rate and regular rhythm.           Pulmonary/Chest: Breath sounds normal.   Abdominal: Abdomen is soft. Bowel sounds are normal. She exhibits no distension and no mass. There is no abdominal tenderness. There is no rebound and no guarding.   Musculoskeletal:      Cervical back: Neck supple.      Left knee: No swelling, deformity, effusion, erythema, ecchymosis or lacerations. Decreased range of motion. Tenderness present.     Neurological: She is alert.   Skin: Skin is warm and dry. Capillary refill takes less than 2 seconds.         Medical Screening Exam   See Full Note    ED Course   Procedures  Labs Reviewed - No data to display       Imaging Results    None          Medications   morphine injection 6 mg (has no administration in time range)   ondansetron injection 4 mg (has no administration in time range)   ketorolac injection 60 mg (has no administration in time range)     Medical Decision Making  50-year-old female presents to the emergency department to be evaluated for left knee pain.  She has been evaluated by orthopedics for knee pain that began about 3 years ago.  She was scheduled for an MRI this morning, had the MRI done, then stood up and felt a pop in her left knee.  She reports she is not able to been her left knee now in her pain is worse.  Treated with morphine, Zofran and Toradol  Diagnosis: Left knee pain  Case discussed with Nerissa SHELDON. she recommended no weight-bearing, crutches and immobilizer    Risk  Prescription drug management.                                      Clinical Impression:   Final diagnoses:  [M25.562] Left knee pain, unspecified chronicity (Primary)        ED Disposition Condition    Discharge Good          ED Prescriptions    None       Follow-up Information    None              [1]   Social History  Tobacco Use    Smoking status: Former     Current packs/day: 0.00     Types: Cigarettes     Quit date: 1993     Years since  quittin.3    Smokeless tobacco: Never   Substance Use Topics    Alcohol use: Never    Drug use: Never        Kailey Hurst, [facility]  [phone]

## 2025-05-01 ENCOUNTER — TELEPHONE (OUTPATIENT)
Dept: ORTHOPEDICS | Facility: CLINIC | Age: 50
End: 2025-05-01

## 2025-05-01 ENCOUNTER — RESULTS FOLLOW-UP (OUTPATIENT)
Dept: ORTHOPEDICS | Facility: CLINIC | Age: 50
End: 2025-05-01

## 2025-05-01 NOTE — TELEPHONE ENCOUNTER
Attempted to contact patient in regards to her appt today to let her know that we will need to rescheduled her appt for today until she has completed her MRI. Patient did not answer left vm 2x.      Thank you,   Roberto Lilly CMA

## 2025-05-01 NOTE — TELEPHONE ENCOUNTER
Spoken with Ms. Downing in regards to her appt on May 8th. I did let patient know that we would need to cancel her appt with Dr. Bautista due to her Bmi and let her know that her insurance would deny her for surgery. I offered patient to see PITO Dias for an injection in her left knee. Patient refused appointment and injection and would like to be scheduled with another surgeon. Ms. Downing originally told me that she could only come on Friday's due to her transportation with her sister. Which is the reason I scheduled her with Dr. Bautista since he's the only one in clinic on Thursday's and Fridays. I did let patient know that the other physicians are in clinic only on Mondays and Wednesday. Patient understood and stated that she would give me a call back when she's ready to be scheduled on one of these days.      Thank you,  Roberto Lilly, CMA

## 2025-05-06 DIAGNOSIS — E87.6 HYPOKALEMIA: ICD-10-CM

## 2025-05-06 DIAGNOSIS — Z87.39 HISTORY OF GOUT: ICD-10-CM

## 2025-05-06 DIAGNOSIS — I10 HYPERTENSION, UNSPECIFIED TYPE: ICD-10-CM

## 2025-05-06 RX ORDER — INDOMETHACIN 50 MG/1
50 CAPSULE ORAL 2 TIMES DAILY WITH MEALS
Qty: 60 CAPSULE | Refills: 1 | Status: SHIPPED | OUTPATIENT
Start: 2025-05-06

## 2025-05-06 RX ORDER — HYDROCHLOROTHIAZIDE 12.5 MG/1
12.5 TABLET ORAL EVERY MORNING
Qty: 90 TABLET | Refills: 1 | Status: SHIPPED | OUTPATIENT
Start: 2025-05-06

## 2025-05-06 RX ORDER — POTASSIUM CHLORIDE 750 MG/1
10 TABLET, EXTENDED RELEASE ORAL DAILY
Qty: 90 TABLET | Refills: 1 | Status: SHIPPED | OUTPATIENT
Start: 2025-05-06

## 2025-05-07 ENCOUNTER — OFFICE VISIT (OUTPATIENT)
Dept: ORTHOPEDICS | Facility: CLINIC | Age: 50
End: 2025-05-07

## 2025-05-07 ENCOUNTER — TELEPHONE (OUTPATIENT)
Dept: ORTHOPEDICS | Facility: CLINIC | Age: 50
End: 2025-05-07

## 2025-05-07 DIAGNOSIS — M17.12 PRIMARY OSTEOARTHRITIS OF LEFT KNEE: Primary | ICD-10-CM

## 2025-05-07 PROCEDURE — 99214 OFFICE O/P EST MOD 30 MIN: CPT | Mod: S$PBB,,, | Performed by: ORTHOPAEDIC SURGERY

## 2025-05-07 PROCEDURE — 99999 PR PBB SHADOW E&M-EST. PATIENT-LVL III: CPT | Mod: PBBFAC,,, | Performed by: ORTHOPAEDIC SURGERY

## 2025-05-07 PROCEDURE — 99213 OFFICE O/P EST LOW 20 MIN: CPT | Mod: PBBFAC | Performed by: ORTHOPAEDIC SURGERY

## 2025-05-07 NOTE — TELEPHONE ENCOUNTER
----- Message from Rubina sent at 5/7/2025  4:21 PM CDT -----  Who Called: Salina Fong is requesting assistance/information from provider's office.Symptoms (please be specific): wants to talk to a jonny Sharif declined to give more information besides saying she needs to talk to her personally  Preferred Method of Contact: Phone CallPatient's Preferred Phone Number on File: [phone] Best Call Back Number, if different:Additional Information:

## 2025-05-07 NOTE — PROGRESS NOTES
CLINIC NOTE       Chief Complaint   Patient presents with    Left Knee - Pain        Salina Dave is a 50 y.o. female seen today for evaluation of left knee pain.  Symptoms began insidiously been slowly progressive over several years period of time.  She has had escalation since 2022.  She indicates that at 1 time she weighed 602 lb.  She now weighs 330 lb.  She is 5 ft 9 in tall and her BMI is 48.44 she ambulates with a walker.  She has developed significant stiffness of the left knee that has only able to flex to 70°.  She does have full knee extension and knee ligaments are stable clinically.  She has saw Nerissa LEE.  She underwent 1 intra-articular steroid injection She had an MRI examination 05/01/2025 showing severe tricompartmental DJD and degenerative tearing of the medial meniscus.  States that she was on in his Ozempic at 1 time but was taken off.  She does not know the reason.  X-rays left knee 03/03/2025 shows severe tricompartmental DJD with joint space narrowing and marginal osteophyte formation.  Past Medical History:   Diagnosis Date    Asthma     Diabetes mellitus     Gout, unspecified     Thyroid disease      Family History   Family history unknown: Yes     Medications Ordered Prior to Encounter[1]    ROS     There were no vitals filed for this visit.    Past Surgical History:   Procedure Laterality Date    EPIDURAL STEROID INJECTION N/A 12/5/2024    Procedure: Injection, Steroid, Epidural, L4/5;  Surgeon: Penny Scott MD;  Location: [facility];  Service: Pain Management;  Laterality: N/A;    FINGER AMPUTATION Left     PARTIAL INDEX FINGER    HYSTERECTOMY      PARTIAL    OPEN REDUCTION AND INTERNAL FIXATION (ORIF) OF INJURY OF ANKLE Right         Review of patient's allergies indicates:   Allergen Reactions    Tramadol Rash        Ortho Exam well-developed well-nourished female no acute distress.  Left knee is normal contour.  No significant effusion.  Knee ligaments  stable clinically.  Knee motion 0 to that has 70° of flexion.    Radiographic Examination:    Technique:    Findings:    Impression:   See Above    Assessment and Plan  Patient Active Problem List    Diagnosis Date Noted    Lumbar radiculopathy, chronic 11/12/2024    Primary osteoarthritis involving multiple joints 11/12/2024    Spondylolisthesis of lumbar region 07/24/2024    Hypothyroidism 11/17/2019    Type 2 diabetes mellitus without complication 11/17/2019    Impression:  1. Severe tricompartmental DJD left knee 2. Morbid obesity  Examination:  The patient is advised because of her weight and knee stiffness she would be high risk for total knee replacement arthroplasty.  Additional weight loss in physical therapy efforts recommended.  She had advised she may seek consultation with [facility] in [place].  She resides in [place].    Anand Bateman M.D.                   [1]   Current Outpatient Medications on File Prior to Visit   Medication Sig Dispense Refill    acetaminophen-codeine 300-30mg (TYLENOL #3) 300-30 mg Tab Take 1 tablet by mouth every 12 (twelve) hours as needed.      atorvastatin (LIPITOR) 20 MG tablet Take 1 tablet (20 mg total) by mouth once daily. 90 tablet 0    cholecalciferol, vitamin D3, (VITAMIN D3 ORAL) Take 1 each by mouth Daily.      gabapentin (NEURONTIN) 300 MG capsule Take 1 capsule (300 mg total) by mouth 3 (three) times daily. 90 capsule 1    glipiZIDE (GLUCOTROL) 10 MG tablet Take 1 tablet (10 mg total) by mouth 2 (two) times daily with meals. 180 tablet 0    hydroCHLOROthiazide 12.5 MG Tab Take 1 tablet (12.5 mg total) by mouth every morning. 90 tablet 1    indomethacin (INDOCIN) 50 MG capsule Take 1 capsule (50 mg total) by mouth 2 (two) times daily with meals. 60 capsule 1    metFORMIN (GLUCOPHAGE) 1000 MG tablet Take 1 tablet (1,000 mg total) by mouth 2 (two) times daily with meals. 180 tablet 1    naloxone (NARCAN) 4 mg/actuation Spry 1 spray by Nasal route once.      omega  3-dha-epa-fish oil (FISH OIL) 1,000 (120-180) mg Cap Take 1 each by mouth once daily.      potassium chloride (KLOR-CON) 10 MEQ TbSR Take 1 tablet (10 mEq total) by mouth once daily. 90 tablet 1     No current facility-administered medications on file prior to visit.

## 2025-05-21 ENCOUNTER — TELEPHONE (OUTPATIENT)
Dept: ORTHOPEDICS | Facility: CLINIC | Age: 50
End: 2025-05-21

## 2025-05-21 ENCOUNTER — HOSPITAL ENCOUNTER (EMERGENCY)
Facility: HOSPITAL | Age: 50
Discharge: HOME | End: 2025-05-21

## 2025-05-21 VITALS
WEIGHT: 293 LBS | HEIGHT: 69 IN | BODY MASS INDEX: 43.4 KG/M2 | SYSTOLIC BLOOD PRESSURE: 138 MMHG | OXYGEN SATURATION: 98 % | TEMPERATURE: 98 F | DIASTOLIC BLOOD PRESSURE: 73 MMHG | RESPIRATION RATE: 18 BRPM | HEART RATE: 96 BPM

## 2025-05-21 DIAGNOSIS — M17.12 OSTEOARTHRITIS OF LEFT KNEE, UNSPECIFIED OSTEOARTHRITIS TYPE: ICD-10-CM

## 2025-05-21 DIAGNOSIS — M25.562 LEFT KNEE PAIN, UNSPECIFIED CHRONICITY: Primary | ICD-10-CM

## 2025-05-21 DIAGNOSIS — S83.242S TEAR OF MEDIAL MENISCUS OF LEFT KNEE, UNSPECIFIED TEAR TYPE, UNSPECIFIED WHETHER OLD OR CURRENT TEAR, SEQUELA: ICD-10-CM

## 2025-05-21 PROCEDURE — 96372 THER/PROPH/DIAG INJ SC/IM: CPT | Performed by: NURSE PRACTITIONER

## 2025-05-21 PROCEDURE — 99284 EMERGENCY DEPT VISIT MOD MDM: CPT | Mod: ,,, | Performed by: NURSE PRACTITIONER

## 2025-05-21 PROCEDURE — 63600175 PHARM REV CODE 636 W HCPCS: Performed by: NURSE PRACTITIONER

## 2025-05-21 PROCEDURE — 99284 EMERGENCY DEPT VISIT MOD MDM: CPT | Mod: 25

## 2025-05-21 RX ORDER — MORPHINE SULFATE 4 MG/ML
6 INJECTION, SOLUTION INTRAMUSCULAR; INTRAVENOUS
Refills: 0 | Status: DISCONTINUED | OUTPATIENT
Start: 2025-05-21 | End: 2025-05-21

## 2025-05-21 RX ORDER — KETOROLAC TROMETHAMINE 30 MG/ML
30 INJECTION, SOLUTION INTRAMUSCULAR; INTRAVENOUS
Status: COMPLETED | OUTPATIENT
Start: 2025-05-21 | End: 2025-05-21

## 2025-05-21 RX ORDER — ONDANSETRON HYDROCHLORIDE 2 MG/ML
4 INJECTION, SOLUTION INTRAVENOUS
Status: DISCONTINUED | OUTPATIENT
Start: 2025-05-21 | End: 2025-05-21

## 2025-05-21 RX ORDER — MORPHINE SULFATE 4 MG/ML
6 INJECTION, SOLUTION INTRAMUSCULAR; INTRAVENOUS
Status: COMPLETED | OUTPATIENT
Start: 2025-05-21 | End: 2025-05-21

## 2025-05-21 RX ORDER — ONDANSETRON HYDROCHLORIDE 2 MG/ML
4 INJECTION, SOLUTION INTRAVENOUS
Status: COMPLETED | OUTPATIENT
Start: 2025-05-21 | End: 2025-05-21

## 2025-05-21 RX ADMIN — MORPHINE SULFATE 6 MG: 4 INJECTION INTRAVENOUS at 06:05

## 2025-05-21 RX ADMIN — KETOROLAC TROMETHAMINE 30 MG: 30 INJECTION, SOLUTION INTRAMUSCULAR at 06:05

## 2025-05-21 RX ADMIN — ONDANSETRON 4 MG: 2 INJECTION INTRAMUSCULAR; INTRAVENOUS at 06:05

## 2025-05-21 NOTE — TELEPHONE ENCOUNTER
Copied from CRM #[id number]. Topic: General Inquiry - Patient Advice  >> May 21, 2025  2:07 PM Lyssa wrote:  Who Called: Salina Dave    Pt is wanting to speak w nurse about an injection. States she is wanting to speak w nurse before scheduling.    Preferred Method of Contact: Phone Call  Patient's Preferred Phone Number on File: [phone]   Best Call Back Number, if different:  Additional Information:    Spoken with patient and I did let her know that we could give her another injection, but since her last injection was in May she can't have another one until June. Patient understood and asked to be referred to total pain to see Dr. Good for her knee. She stated that ibuprofen is no longer helping. I let patient know that I would notify PITO Dias about her question about her referral and contact her with more information afterwards.      Thank you,  Roberto Lilly,CMA

## 2025-05-21 NOTE — ED PROVIDER NOTES
Encounter Date: 5/21/2025       History     Chief Complaint   Patient presents with    Knee Pain     chronic     49 y/o AAF presents to the emergency department with c/o left knee pain. Patient is being followed by orthopedics at [facility]. Had MRI in April and has followed up with Dr. Bateman. At that time it was recommended she do PT and weight loss prior to having surgery. She states that she was standing earlier today when her knee popped and she has had increased pain since that time. She states she was told to stop taking Ibuprofen. She has had nothing for her symptoms. She contacted ortho today and spoke with the nurse and is awaiting a call back.     The history is provided by the patient and medical records.     Review of patient's allergies indicates:   Allergen Reactions    Tramadol Rash     Past Medical History:   Diagnosis Date    Asthma     Diabetes mellitus     Gout, unspecified     Thyroid disease      Past Surgical History:   Procedure Laterality Date    EPIDURAL STEROID INJECTION N/A 12/5/2024    Procedure: Injection, Steroid, Epidural, L4/5;  Surgeon: Penny Scott MD;  Location: [facility];  Service: Pain Management;  Laterality: N/A;    FINGER AMPUTATION Left     PARTIAL INDEX FINGER    HYSTERECTOMY      PARTIAL    OPEN REDUCTION AND INTERNAL FIXATION (ORIF) OF INJURY OF ANKLE Right      Family History   Family history unknown: Yes     Social History[1]  Review of Systems   All other systems reviewed and are negative.      Physical Exam     Initial Vitals [05/21/25 1840]   BP Pulse Resp Temp SpO2   138/73 96 17 97.9 °F (36.6 °C) 98 %      MAP       --         Physical Exam    Constitutional: She appears well-developed and well-nourished. She is cooperative.  Non-toxic appearance.   BMI >30   Cardiovascular:  Normal rate, regular rhythm, normal heart sounds and intact distal pulses.           Musculoskeletal:      Left knee: Swelling (mild, medially) present. No deformity. Decreased  range of motion. Tenderness present. Normal pulse.      Comments: NVI distally     Neurological: She is alert and oriented to person, place, and time. GCS eye subscore is 4. GCS verbal subscore is 5. GCS motor subscore is 6.   Skin: Skin is warm, dry and intact. Capillary refill takes less than 2 seconds.         Medical Screening Exam   See Full Note    ED Course   Procedures  Labs Reviewed - No data to display       Imaging Results    None          Medications   ketorolac injection 30 mg (30 mg Intramuscular Given 5/21/25 1855)   morphine injection 6 mg (6 mg Intramuscular Given 5/21/25 1856)   ondansetron injection 4 mg (4 mg Intramuscular Given 5/21/25 1856)     Medical Decision Making  51 y/o AAF presents to the emergency department with c/o left knee pain. Patient is being followed by orthopedics at [facility]. Had MRI in April and has followed up with Dr. Bateman. At that time it was recommended she do PT and weight loss prior to having surgery. She states that she was standing earlier today when her knee popped and she has had increased pain since that time. She states she was told to stop taking Ibuprofen. She has had nothing for her symptoms. She contacted ortho today and spoke with the nurse and is awaiting a call back.     Problems Addressed:  Left knee pain, unspecified chronicity:     Details: Plain film imaging will be of no benefit to her. Will give analgesics and recommend pt contact ortho in AM. Follow up instructions given. Warning s/s discussed and return precautions given; the patient has v/u.      Risk  OTC drugs.  Prescription drug management.                                      Clinical Impression:   Final diagnoses:  [M25.562] Left knee pain, unspecified chronicity (Primary)  [M17.12] Osteoarthritis of left knee, unspecified osteoarthritis type  [S83.242S] Tear of medial meniscus of left knee, unspecified tear type, unspecified whether old or current tear, sequela        ED Disposition  Condition    Discharge Stable          ED Prescriptions    None       Follow-up Information       Follow up With Specialties Details Why Contact Info    Orthopedics  Call in 1 day                 [1]   Social History  Tobacco Use    Smoking status: Former     Current packs/day: 0.00     Types: Cigarettes     Quit date:      Years since quittin.4    Smokeless tobacco: Never   Substance Use Topics    Alcohol use: Never    Drug use: Never        Juana Khanna FNP  25 [id number]

## 2025-05-21 NOTE — DISCHARGE INSTRUCTIONS
Call your orthopedic doctor in the morning and let them know you are having increased pain and follow up per their recommendations. Return to the ED for worsening signs and symptoms or otherwise as needed.

## 2025-05-22 DIAGNOSIS — M25.562 ACUTE PAIN OF LEFT KNEE: Primary | ICD-10-CM

## 2025-05-23 ENCOUNTER — PATIENT OUTREACH (OUTPATIENT)
Facility: OTHER | Age: 50
End: 2025-05-23

## 2025-05-30 ENCOUNTER — PATIENT OUTREACH (OUTPATIENT)
Facility: OTHER | Age: 50
End: 2025-05-30

## 2025-05-30 NOTE — PROGRESS NOTES
ED navigator attempted to contact patient to remind of her appointment on 6-2-25 with Tierra Alvarado NP at 2:40 pm. Unable to reach patient at this time. Left a message.     Noelle Scott ED Navigator   [phone]

## 2025-06-02 ENCOUNTER — OFFICE VISIT (OUTPATIENT)
Dept: FAMILY MEDICINE | Facility: CLINIC | Age: 50
End: 2025-06-02

## 2025-06-02 VITALS
OXYGEN SATURATION: 98 % | SYSTOLIC BLOOD PRESSURE: 132 MMHG | WEIGHT: 293 LBS | HEART RATE: 98 BPM | RESPIRATION RATE: 20 BRPM | TEMPERATURE: 98 F | DIASTOLIC BLOOD PRESSURE: 84 MMHG | BODY MASS INDEX: 43.4 KG/M2 | HEIGHT: 69 IN

## 2025-06-02 DIAGNOSIS — Z13.1 SCREENING FOR DIABETES MELLITUS (DM): ICD-10-CM

## 2025-06-02 DIAGNOSIS — E11.9 DIABETES MELLITUS TYPE II, NON INSULIN DEPENDENT: Primary | ICD-10-CM

## 2025-06-02 DIAGNOSIS — Z87.39 HISTORY OF GOUT: ICD-10-CM

## 2025-06-02 DIAGNOSIS — I10 HYPERTENSION, UNSPECIFIED TYPE: ICD-10-CM

## 2025-06-02 DIAGNOSIS — E78.2 MIXED HYPERLIPIDEMIA: ICD-10-CM

## 2025-06-02 DIAGNOSIS — M54.16 LUMBAR RADICULOPATHY, CHRONIC: Chronic | ICD-10-CM

## 2025-06-02 PROCEDURE — 99213 OFFICE O/P EST LOW 20 MIN: CPT | Mod: ,,, | Performed by: NURSE PRACTITIONER

## 2025-06-02 RX ORDER — GABAPENTIN 300 MG/1
300 CAPSULE ORAL 3 TIMES DAILY
Qty: 90 CAPSULE | Refills: 1 | Status: SHIPPED | OUTPATIENT
Start: 2025-06-02

## 2025-06-02 RX ORDER — ATORVASTATIN CALCIUM 20 MG/1
20 TABLET, FILM COATED ORAL DAILY
Qty: 90 TABLET | Refills: 1 | Status: SHIPPED | OUTPATIENT
Start: 2025-06-02 | End: 2026-06-02

## 2025-06-02 RX ORDER — GLIPIZIDE 10 MG/1
10 TABLET ORAL 2 TIMES DAILY WITH MEALS
Qty: 180 TABLET | Refills: 1 | Status: SHIPPED | OUTPATIENT
Start: 2025-06-02

## 2025-06-02 RX ORDER — INDOMETHACIN 50 MG/1
50 CAPSULE ORAL 2 TIMES DAILY WITH MEALS
Qty: 180 CAPSULE | Refills: 1 | Status: SHIPPED | OUTPATIENT
Start: 2025-06-02

## 2025-06-02 RX ORDER — SEMAGLUTIDE 0.68 MG/ML
INJECTION, SOLUTION SUBCUTANEOUS
Qty: 6 ML | Refills: 0 | Status: SHIPPED | OUTPATIENT
Start: 2025-06-02

## 2025-06-02 RX ORDER — SEMAGLUTIDE 0.68 MG/ML
0.25 INJECTION, SOLUTION SUBCUTANEOUS
Qty: 2 ML | Refills: 1 | Status: SHIPPED | OUTPATIENT
Start: 2025-06-02 | End: 2025-06-02 | Stop reason: SDUPTHER

## 2025-06-06 ENCOUNTER — OFFICE VISIT (OUTPATIENT)
Dept: FAMILY MEDICINE | Facility: CLINIC | Age: 50
End: 2025-06-06

## 2025-06-06 VITALS
OXYGEN SATURATION: 96 % | WEIGHT: 293 LBS | HEART RATE: 110 BPM | TEMPERATURE: 98 F | DIASTOLIC BLOOD PRESSURE: 80 MMHG | SYSTOLIC BLOOD PRESSURE: 126 MMHG | HEIGHT: 69 IN | BODY MASS INDEX: 43.4 KG/M2 | RESPIRATION RATE: 18 BRPM

## 2025-06-06 DIAGNOSIS — N89.8 VAGINAL ITCHING: ICD-10-CM

## 2025-06-06 DIAGNOSIS — B37.31 VAGINAL YEAST INFECTION: Primary | ICD-10-CM

## 2025-06-06 LAB
BACTERIAL VAGINOSIS DNA (OHS): NEGATIVE
CANDIDA GLABRATA/KRUSEI DNA (OHS): NOT DETECTED
CANDIDA SPECIES DNA (OHS): DETECTED
TRICHOMONAS VAGINALIS DNA (OHS): NOT DETECTED

## 2025-06-06 PROCEDURE — 81515 NFCT DS BV&VAGINITIS DNA ALG: CPT | Mod: QW,,, | Performed by: CLINICAL MEDICAL LABORATORY

## 2025-06-06 PROCEDURE — 99213 OFFICE O/P EST LOW 20 MIN: CPT | Mod: ,,, | Performed by: STUDENT IN AN ORGANIZED HEALTH CARE EDUCATION/TRAINING PROGRAM

## 2025-06-06 NOTE — PROGRESS NOTES
Progress Note     CRESENCIO COHOA MD   [address]  [place] [address]     PATIENT NAME: Salina Dave  : 1975  DATE: 25  MRN: [id number]      Billing Provider: CRESENCIO OCHOA MD  Level of Service:   Patient PCP Information       Provider PCP Type    Primary Doctor No General                Vaginal Pain (Pt reports pain and blood on the outside of her vagina. Also pt states she has vaginal itching )      SUBJECTIVE:     Salina Dave is a 50 y.o.female who presents to clinic for Vaginal Pain (Pt reports pain and blood on the outside of her vagina. Also pt states she has vaginal itching )    Patient presents to clinic today for vaginal pain and vaginal itching.  Patient notes onset earlier in the week.  Patient states she tried to put Monistat on it yesterday but it caused discomfort.  Patient has not been sexually active in over 2 years.  She denies any vaginal discharge.  She notes that she is also having some bleeding from the outside of her vagina.  She has never been diagnosed with herpes.  She is status post hysterectomy secondary to benign ovarian cyst.    All other pertinent review of systems negative. Please see HPI for details.     Past Medical History:  has a past medical history of Asthma, Diabetes mellitus, Gout, unspecified, and Thyroid disease.   Past Surgical History:  has a past surgical history that includes Hysterectomy; Finger amputation (Left); Open reduction and internal fixation (ORIF) of injury of ankle (Right); Epidural steroid injection (N/A, 2024); Hernia repair (); Appendectomy (); Cholecystectomy (); Fracture surgery (); Small intestine surgery (); and Colon surgery ().  Family History: family history includes Arthritis in her father and mother; Cancer in her father, maternal aunt, maternal grandfather, and maternal grandmother; Diabetes in her father and mother; Hearing loss in her father;  "Hypertension in her father; Stroke in her father.  Social History:  reports that she quit smoking about 32 years ago. Her smoking use included cigarettes. She started smoking about 33 years ago. She has a 1 pack-year smoking history. She has been exposed to tobacco smoke. She has never used smokeless tobacco. She reports that she does not drink alcohol and does not use drugs.  Allergies:   Review of patient's allergies indicates:   Allergen Reactions    Tramadol Rash       Current Medications[1]   OBJECTIVE:     Vital Signs   /80 (BP Location: Right forearm, Patient Position: Sitting)   Pulse 110   Temp 97.7 °F (36.5 °C)   Resp 18   Ht 5' 9" (1.753 m)   Wt (!) 158.7 kg (349 lb 12.8 oz)   SpO2 96%   BMI 51.66 kg/m²     Physical Exam  Constitutional:       General: She is not in acute distress.     Appearance: Normal appearance. She is not ill-appearing, toxic-appearing or diaphoretic.   HENT:      Head: Normocephalic and atraumatic.      Mouth/Throat:      Mouth: Mucous membranes are moist.      Pharynx: No oropharyngeal exudate or posterior oropharyngeal erythema.   Eyes:      Extraocular Movements: Extraocular movements intact.      Pupils: Pupils are equal, round, and reactive to light.   Cardiovascular:      Rate and Rhythm: Normal rate and regular rhythm.      Pulses: Normal pulses.      Heart sounds: Normal heart sounds. No murmur heard.     No friction rub. No gallop.   Pulmonary:      Effort: Pulmonary effort is normal. No respiratory distress.      Breath sounds: No wheezing, rhonchi or rales.   Abdominal:      General: Abdomen is flat.      Palpations: Abdomen is soft.      Tenderness: There is no abdominal tenderness. There is no guarding or rebound.   Genitourinary:     Comments: Internal exam deferred given patient discomfort.  Externally patient with thick white vaginal discharge with some visible irritation.  No lesions consistent with herpes or syphilis.  Musculoskeletal:         General: " Normal range of motion.      Cervical back: Normal range of motion.   Skin:     General: Skin is warm and dry.      Capillary Refill: Capillary refill takes less than 2 seconds.   Neurological:      General: No focal deficit present.      Mental Status: She is alert.   Psychiatric:         Mood and Affect: Mood normal.         Behavior: Behavior normal.         ASSESSMENT/PLAN:     1. Vaginal yeast infection    2. Vaginal itching  -     Vaginosis Screen by DNA Probe    Other orders  -     fluconazole (DIFLUCAN) 150 MG Tab; Take one tablet today. Repeat in 3 days if still symptomatic.  Dispense: 2 tablet; Refill: 0    Vaginal swab obtained. Patient positive for yeast. Diflucan prescription provided. Patient to FU if symptoms worsen or fail to improve.     Follow up if symptoms worsen or fail to improve.      CRESENCIO OCHOA MD  06/09/2025    Due to voice recognition software, sound alike and misspelled words may be contained in the documentation.              [1]   Current Outpatient Medications:     atorvastatin (LIPITOR) 20 MG tablet, Take 1 tablet (20 mg total) by mouth once daily., Disp: 90 tablet, Rfl: 1    gabapentin (NEURONTIN) 300 MG capsule, Take 1 capsule (300 mg total) by mouth 3 (three) times daily., Disp: 90 capsule, Rfl: 1    glipiZIDE (GLUCOTROL) 10 MG tablet, Take 1 tablet (10 mg total) by mouth 2 (two) times daily with meals., Disp: 180 tablet, Rfl: 1    hydroCHLOROthiazide 12.5 MG Tab, Take 1 tablet (12.5 mg total) by mouth every morning., Disp: 90 tablet, Rfl: 1    indomethacin (INDOCIN) 50 MG capsule, Take 1 capsule (50 mg total) by mouth 2 (two) times daily with meals., Disp: 180 capsule, Rfl: 1    metFORMIN (GLUCOPHAGE) 1000 MG tablet, Take 1 tablet (1,000 mg total) by mouth 2 (two) times daily with meals., Disp: 180 tablet, Rfl: 1    naloxone (NARCAN) 4 mg/actuation Spry, 1 spray by Nasal route once., Disp: , Rfl:     omega 3-dha-epa-fish oil (FISH OIL) 1,000 (120-180) mg Cap, Take 1  each by mouth once daily., Disp: , Rfl:     potassium chloride (KLOR-CON) 10 MEQ TbSR, Take 1 tablet (10 mEq total) by mouth once daily., Disp: 90 tablet, Rfl: 1    semaglutide (OZEMPIC) 0.25 mg or 0.5 mg (2 mg/3 mL) pen injector, Inject 0.25 mg every 7 days x 4 weeks, then 0.50 mg every 7 days following, Disp: 6 mL, Rfl: 0    fluconazole (DIFLUCAN) 150 MG Tab, Take one tablet today. Repeat in 3 days if still symptomatic., Disp: 2 tablet, Rfl: 0

## 2025-06-07 ENCOUNTER — RESULTS FOLLOW-UP (OUTPATIENT)
Dept: FAMILY MEDICINE | Facility: CLINIC | Age: 50
End: 2025-06-07

## 2025-06-07 RX ORDER — FLUCONAZOLE 150 MG/1
TABLET ORAL
Qty: 2 TABLET | Refills: 0 | Status: SHIPPED | OUTPATIENT
Start: 2025-06-07

## 2025-06-12 ENCOUNTER — PATIENT OUTREACH (OUTPATIENT)
Facility: OTHER | Age: 50
End: 2025-06-12

## 2025-06-13 NOTE — PROGRESS NOTES
ED navigator contacted patient for a follow up. Patient states that she is still hurting but she is trying to deal with the pain. Patient has been taking Tylenol. Patient wasn't able to go see Dr. Gonzáles on 6-10-25. Patient states that she doesn't have any insurance and they said she was going to have to pay out of pocket. Patient has been using financial assistance but states that they told her she can only use it so many times. ED navigator gave patient the financial assistance number to contact [phone]. Patient states that she wasn't able to get [facility] on the phone but she will go by the office to talk to them. Patient hasn't heard from Jenny yet about getting insurance back but states that she is going to reach out to them next week. ED navigator plans to follow-up with patient on/around 7-15-25.    Noelle Scott ED Navigator   [phone]

## 2025-06-19 NOTE — PROGRESS NOTES
JESUS Pappas   [facility] [address] [place] [address]  [phone]      PATIENT NAME: Salina Dave  : 1975  DATE: 25  MRN: [id number]      Billing Provider: JESUS Pappas  Level of Service:   Patient PCP Information       Provider PCP Type    Primary Doctor No General            History of Present Illness    CHIEF COMPLAINT:  Patient presents today for left knee pain after visiting the ER several days ago    LEFT KNEE PAIN:  She reports her left knee locked up while standing, causing inability to bend the joint. This is the second occurrence of this issue. She has limited range of motion of approximately 37 degrees and reports having a meniscus tear. She received an injection which provided minimal relief lasting only one day. She is currently using crutches but reports difficulty due to a blister under her arm. She denies ability to bend the knee sufficiently for walking due to severe pain. During physical therapy evaluation, it was determined that therapy was not possible due to limited range of motion. Previous surgical consultation determined that surgery was not possible due to high body mass index.    MEDICAL HISTORY:  She has a history of significant weight loss, previously weighing almost 600 lbs, which was achieved without medical intervention. Her weight loss has reached a plateau. She also has a history of hernia repair from several years ago.    CURRENT MEDICATIONS:  She takes Lipitor and metformin for diabetes management.      ROS:  General: -fever, -chills, -fatigue, -weight gain, -weight loss  Eyes: -vision changes, -redness, -discharge  ENT: -ear pain, -nasal congestion, -sore throat  Cardiovascular: -chest pain, -palpitations, -lower extremity edema  Respiratory: -cough, -shortness of breath  Gastrointestinal: -abdominal pain, +nausea, -vomiting, -diarrhea, -constipation, -blood in stool  Genitourinary:  -dysuria, -hematuria, -frequency  Musculoskeletal: +joint pain, -muscle pain, +pain with movement, +limb pain, +joint stiffness, +limited movement, +joint swelling  Skin: -rash, -lesion  Neurological: -headache, -dizziness, -numbness, -tingling  Psychiatric: -anxiety, -depression, -sleep difficulty          Medications and Allergies     Medications  Outpatient Medications Marked as Taking for the 6/2/25 encounter (Office Visit) with Tierra Alvarado FNP   Medication Sig Dispense Refill    hydroCHLOROthiazide 12.5 MG Tab Take 1 tablet (12.5 mg total) by mouth every morning. 90 tablet 1    metFORMIN (GLUCOPHAGE) 1000 MG tablet Take 1 tablet (1,000 mg total) by mouth 2 (two) times daily with meals. 180 tablet 1    naloxone (NARCAN) 4 mg/actuation Spry 1 spray by Nasal route once.      omega 3-dha-epa-fish oil (FISH OIL) 1,000 (120-180) mg Cap Take 1 each by mouth once daily.      potassium chloride (KLOR-CON) 10 MEQ TbSR Take 1 tablet (10 mEq total) by mouth once daily. 90 tablet 1    [DISCONTINUED] atorvastatin (LIPITOR) 20 MG tablet Take 1 tablet (20 mg total) by mouth once daily. 90 tablet 0    [DISCONTINUED] gabapentin (NEURONTIN) 300 MG capsule Take 1 capsule (300 mg total) by mouth 3 (three) times daily. 90 capsule 1    [DISCONTINUED] glipiZIDE (GLUCOTROL) 10 MG tablet Take 1 tablet (10 mg total) by mouth 2 (two) times daily with meals. 180 tablet 0    [DISCONTINUED] indomethacin (INDOCIN) 50 MG capsule Take 1 capsule (50 mg total) by mouth 2 (two) times daily with meals. 60 capsule 1       Allergies  Review of patient's allergies indicates:   Allergen Reactions    Tramadol Rash       Physical Exam  Constitutional:       General: She is not in acute distress.     Appearance: She is obese.   HENT:      Head: Normocephalic.      Nose: Nose normal.      Mouth/Throat:      Mouth: Mucous membranes are moist.   Eyes:      Extraocular Movements: Extraocular movements intact.   Cardiovascular:      Rate and Rhythm: Normal  "rate.   Pulmonary:      Effort: Pulmonary effort is normal. No respiratory distress.   Abdominal:      General: Bowel sounds are normal.      Palpations: Abdomen is soft.   Musculoskeletal:         General: Swelling (left knee) and tenderness present. No signs of injury.      Cervical back: Normal range of motion.   Skin:     General: Skin is warm.   Neurological:      Mental Status: She is alert and oriented to person, place, and time.   Psychiatric:         Behavior: Behavior normal.          Assessment & Plan      LEFT KNEE PAIN AND DERANGEMENT:  - Monitored patient's left knee pain which locks up, prevents bending, and causes severe pain with movement attempts.  - Patient has limited range of motion and  notable swelling.  - Previously seen by orthopedics and ER with XR and MRI completed, which confirmed a tear in the knee.  - Continuing Gabapentin for pain management.  - Referred to [facility] for further evaluation and treatment options.    LEFT KNEE EFFUSION AND STIFFNESS:  - Monitored stiffness in left knee which contributes to limited range of motion and inability to bend.  - Referred to [facility] for further evaluation of this condition.    TYPE 2 DIABETES MELLITUS:  - Monitored diabetes status.  - Current medications include Lipitor and metformin.  - Discussed weight loss medications for diabetes management.  - Prescribed Ozempic 0.25 mg weekly for 4 weeks, then 0.5 mg weekly thereafter (pending approval through patient assistance program).  - Advised patient to research patient assistance programs for Ozempic or Mounjaro online.    DEPENDENCE ON ENABLING DEVICES:  - Patient is supposed to be using crutches due to knee pain and limited mobility.  - Assessed continued need for assistive devices and recommended continuing use of crutches for mobility support.    OBESITY (BMI 70 OR GREATER):  - Patient reports weighing "300 and something lbs" with a high BMI that affects treatment options.  - Patient is not a surgical " candidate due to high BMI.  - Discussed weight management strategies and prescribed Ozempic 0.25 mg weekly for 4 weeks, then 0.5 mg weekly thereafter for weight management (pending approval through patient assistance program).      Follow up in 2 months to monitor new medication or if symptoms worsen or fail to improve.     Health Maintenance Due   Topic Date Due    Diabetes Urine Screening  Never done    Foot Exam  Never done    Diabetic Eye Exam  Never done    Mammogram  Never done    Pneumococcal Vaccines (Age 50+) (1 of 2 - PCV) Never done    TETANUS VACCINE  01/12/2017    Colorectal Cancer Screening  Never done    Shingles Vaccine (1 of 2) Never done    COVID-19 Vaccine (3 - 2024-25 season) 09/01/2024    Hemoglobin A1c  06/03/2025       Problem List Items Addressed This Visit          Neuro    Lumbar radiculopathy, chronic (Chronic)    Relevant Medications    gabapentin (NEURONTIN) 300 MG capsule     Other Visit Diagnoses         Diabetes mellitus type II, non insulin dependent    -  Primary    Relevant Medications    glipiZIDE (GLUCOTROL) 10 MG tablet    semaglutide (OZEMPIC) 0.25 mg or 0.5 mg (2 mg/3 mL) pen injector      Mixed hyperlipidemia        Relevant Medications    atorvastatin (LIPITOR) 20 MG tablet      Screening for diabetes mellitus (DM)        Relevant Medications    glipiZIDE (GLUCOTROL) 10 MG tablet      Hypertension, unspecified type          History of gout        Relevant Medications    indomethacin (INDOCIN) 50 MG capsule            Health Maintenance Topics with due status: Not Due       Topic Last Completion Date    Lipid Panel 03/03/2025    Low Dose Statin 06/06/2025    Influenza Vaccine Not Due    RSV Vaccine (Age 60+ and Pregnant patients) Not Due       Future Appointments   Date Time Provider Department Center   8/4/2025  1:40 PM Tierra Alvarado FNP [facility]        This note was generated with the assistance of ambient listening technology. Verbal consent was obtained  by the patient and accompanying visitor(s) for the recording of patient appointment to facilitate this note. I attest to having reviewed and edited the generated note for accuracy, though some syntax or spelling errors may persist. Please contact the author of this note for any clarification.     Signature:  JESUS Pappas  [facility] [address] [place] [address]  [phone]    Date of encounter: 6/2/25

## 2025-07-17 ENCOUNTER — PATIENT OUTREACH (OUTPATIENT)
Facility: OTHER | Age: 50
End: 2025-07-17

## 2025-07-17 NOTE — PROGRESS NOTES
ED navigator contacted patient for a follow up. Patient states that she is still in a lot of pain. Patient states that she can't get insurance with [employer] until open enrollment again in November. Patient states that she reached out to financial assistance and they said she doesn't qualify for it. Patient has an appointment on 8-4-25 with PCP but states that she is probably going to have to cancel it due to not able to pay for the visit. Patient states that she needs to go the appointment to be able to get labwork done. Patient states that Tierra Alvarado NP office called last week and she told them her situation. Patient states that the nurse said they would see what they could do but she hasn't heard anything else. ED navigator ensured patient had no other needs at this time. Patient was given [facility] Nurse triage line, [phone] or [phone] ([phone]) contact information. ED navigator plans to follow-up with patient on/around 8-12-25.    Noelle Scott ED Navigator   [phone]

## 2025-08-12 ENCOUNTER — PATIENT OUTREACH (OUTPATIENT)
Facility: OTHER | Age: 50
End: 2025-08-12

## 2025-08-18 ENCOUNTER — PATIENT OUTREACH (OUTPATIENT)
Facility: OTHER | Age: 50
End: 2025-08-18

## 2025-08-19 ENCOUNTER — PATIENT OUTREACH (OUTPATIENT)
Facility: OTHER | Age: 50
End: 2025-08-19

## (undated) DEVICE — APPLICATOR CHLORAPREP ORN 26ML

## (undated) DEVICE — SYR EPILOR LUER-LOK LOR 7ML

## (undated) DEVICE — TRAY NERVE BLOCK UNIV 10/CA

## (undated) DEVICE — NDL TUOHY 20G X 6.0

## (undated) DEVICE — SLIPPER FALL PREV YEL BARIAT

## (undated) DEVICE — GLOVE SENSICARE PI SURG 6.5